# Patient Record
Sex: FEMALE | Race: BLACK OR AFRICAN AMERICAN | Employment: FULL TIME | URBAN - METROPOLITAN AREA
[De-identification: names, ages, dates, MRNs, and addresses within clinical notes are randomized per-mention and may not be internally consistent; named-entity substitution may affect disease eponyms.]

---

## 2024-03-12 ENCOUNTER — OFFICE VISIT (OUTPATIENT)
Dept: INTERNAL MEDICINE CLINIC | Facility: CLINIC | Age: 29
End: 2024-03-12
Payer: COMMERCIAL

## 2024-03-12 VITALS
OXYGEN SATURATION: 98 % | HEIGHT: 64 IN | WEIGHT: 196.2 LBS | BODY MASS INDEX: 33.49 KG/M2 | SYSTOLIC BLOOD PRESSURE: 116 MMHG | HEART RATE: 101 BPM | DIASTOLIC BLOOD PRESSURE: 76 MMHG

## 2024-03-12 DIAGNOSIS — J06.9 ACUTE URI: ICD-10-CM

## 2024-03-12 DIAGNOSIS — Z13.6 ENCOUNTER FOR LIPID SCREENING FOR CARDIOVASCULAR DISEASE: ICD-10-CM

## 2024-03-12 DIAGNOSIS — Z3A.08 8 WEEKS GESTATION OF PREGNANCY: ICD-10-CM

## 2024-03-12 DIAGNOSIS — Z13.220 ENCOUNTER FOR LIPID SCREENING FOR CARDIOVASCULAR DISEASE: ICD-10-CM

## 2024-03-12 DIAGNOSIS — Z00.00 ANNUAL PHYSICAL EXAM: Primary | ICD-10-CM

## 2024-03-12 PROBLEM — M25.562 CHRONIC PAIN OF LEFT KNEE: Status: ACTIVE | Noted: 2024-03-12

## 2024-03-12 PROBLEM — G89.29 CHRONIC PAIN OF LEFT KNEE: Status: ACTIVE | Noted: 2024-03-12

## 2024-03-12 PROCEDURE — 99385 PREV VISIT NEW AGE 18-39: CPT | Performed by: INTERNAL MEDICINE
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
## 2024-03-12 NOTE — PATIENT INSTRUCTIONS
-Take Mucinex for cough and congestion  -Administer a saline nasal spray as needed for sinus congestion  -Take Tylenol for pain and/or fever   -Perform daily salt water gargles for sore throat      Wellness Visit for Adults   AMBULATORY CARE:   A wellness visit  is when you see your healthcare provider to get screened for health problems. Your healthcare provider will also give you advice on how to stay healthy. Write down your questions so you remember to ask them. Ask your healthcare provider how often you should have a wellness visit.  What happens at a wellness visit:  Your healthcare provider will ask about your health, and your family history of health problems. This includes high blood pressure, heart disease, and cancer. He or she will ask if you have symptoms that concern you, if you smoke, and about your mood. You may also be asked about your intake of medicines, supplements, food, and alcohol. Any of the following may be done:  Your weight  will be checked. Your height may also be checked so your body mass index (BMI) can be calculated. Your BMI shows if you are at a healthy weight.    Your blood pressure  and heart rate will be checked. Your temperature may also be checked.    Blood and urine tests  may be done. Blood tests may be done to check your cholesterol levels. Abnormal cholesterol levels increase your risk for heart disease and stroke. You may also need a blood or urine test to check for diabetes if you are at increased risk. Urine tests may be done to look for signs of an infection or kidney disease.    A physical exam  includes checking your heartbeat and lungs with a stethoscope. Your healthcare provider may also check your skin to look for sun damage.    Screening tests  may be recommended. A screening test is done to check for diseases that may not cause symptoms. The screening tests you may need depend on your age, gender, family history, and lifestyle habits. For example, colorectal  screening may be recommended if you are 50 years old or older.    Screening tests you need if you are a woman:   A Pap smear  is used to screen for cervical cancer. Pap smears are usually done every 3 to 5 years depending on your age. You may need them more often if you have had abnormal Pap smear test results in the past. Ask your healthcare provider how often you should have a Pap smear.    A mammogram  is an x-ray of your breasts to screen for breast cancer. Experts recommend mammograms every 2 years starting at age 50 years. You may need a mammogram at age 49 years or younger if you have an increased risk for breast cancer. Talk to your healthcare provider about when you should start having mammograms and how often you need them.    Vaccines you may need:   Get an influenza vaccine  every year. The influenza vaccine protects you from the flu. Several types of viruses cause the flu. The viruses change over time, so new vaccines are made each year.    Get a tetanus-diphtheria (Td) booster vaccine  every 10 years. This vaccine protects you against tetanus and diphtheria. Tetanus is a severe infection that may cause painful muscle spasms and lockjaw. Diphtheria is a severe bacterial infection that causes a thick covering in the back of your mouth and throat.    Get a human papillomavirus (HPV) vaccine  if you are female and aged 19 to 26 or male 19 to 21 and never received it. This vaccine protects you from HPV infection. HPV is the most common infection spread by sexual contact. HPV may also cause vaginal, penile, and anal cancers.    Get a pneumococcal vaccine  if you are aged 65 years or older. The pneumococcal vaccine is an injection given to protect you from pneumococcal disease. Pneumococcal disease is an infection caused by pneumococcal bacteria. The infection may cause pneumonia, meningitis, or an ear infection.    Get a shingles vaccine  if you are 60 or older, even if you have had shingles before. The  shingles vaccine is an injection to protect you from the varicella-zoster virus. This is the same virus that causes chickenpox. Shingles is a painful rash that develops in people who had chickenpox or have been exposed to the virus.    How to eat healthy:  My Plate is a model for planning healthy meals. It shows the types and amounts of foods that should go on your plate. Fruits and vegetables make up about half of your plate, and grains and protein make up the other half. A serving of dairy is included on the side of your plate. The amount of calories and serving sizes you need depends on your age, gender, weight, and height. Examples of healthy foods are listed below:  Eat a variety of vegetables  such as dark green, red, and orange vegetables. You can also include canned vegetables low in sodium (salt) and frozen vegetables without added butter or sauces.    Eat a variety of fresh fruits , canned fruit in 100% juice, frozen fruit, and dried fruit.    Include whole grains.  At least half of the grains you eat should be whole grains. Examples include whole-wheat bread, wheat pasta, brown rice, and whole-grain cereals such as oatmeal.    Eat a variety of protein foods such as seafood (fish and shellfish), lean meat, and poultry without skin (turkey and chicken). Examples of lean meats include pork leg, shoulder, or tenderloin, and beef round, sirloin, tenderloin, and extra lean ground beef. Other protein foods include eggs and egg substitutes, beans, peas, soy products, nuts, and seeds.    Choose low-fat dairy products such as skim or 1% milk or low-fat yogurt, cheese, and cottage cheese.    Limit unhealthy fats  such as butter, hard margarine, and shortening.       Exercise:  Exercise at least 30 minutes per day on most days of the week. Some examples of exercise include walking, biking, dancing, and swimming. You can also fit in more physical activity by taking the stairs instead of the elevator or parking farther  away from stores. Include muscle strengthening activities 2 days each week. Regular exercise provides many health benefits. It helps you manage your weight, and decreases your risk for type 2 diabetes, heart disease, stroke, and high blood pressure. Exercise can also help improve your mood. Ask your healthcare provider about the best exercise plan for you.       General health and safety guidelines:   Do not smoke.  Nicotine and other chemicals in cigarettes and cigars can cause lung damage. Ask your healthcare provider for information if you currently smoke and need help to quit. E-cigarettes or smokeless tobacco still contain nicotine. Talk to your healthcare provider before you use these products.    Limit alcohol.  A drink of alcohol is 12 ounces of beer, 5 ounces of wine, or 1½ ounces of liquor.    Lose weight, if needed.  Being overweight increases your risk of certain health conditions. These include heart disease, high blood pressure, type 2 diabetes, and certain types of cancer.    Protect your skin.  Do not sunbathe or use tanning beds. Use sunscreen with a SPF 15 or higher. Apply sunscreen at least 15 minutes before you go outside. Reapply sunscreen every 2 hours. Wear protective clothing, hats, and sunglasses when you are outside.    Drive safely.  Always wear your seatbelt. Make sure everyone in your car wears a seatbelt. A seatbelt can save your life if you are in an accident. Do not use your cell phone when you are driving. This could distract you and cause an accident. Pull over if you need to make a call or send a text message.    Practice safe sex.  Use latex condoms if are sexually active and have more than one partner. Your healthcare provider may recommend screening tests for sexually transmitted infections (STIs).    Wear helmets, lifejackets, and protective gear.  Always wear a helmet when you ride a bike or motorcycle, go skiing, or play sports that could cause a head injury. Wear protective  equipment when you play sports. Wear a lifejacket when you are on a boat or doing water sports.    © Copyright [employer] 2023 Information is for End User's use only and may not be sold, redistributed or otherwise used for commercial purposes.  The above information is an  only. It is not intended as medical advice for individual conditions or treatments. Talk to your doctor, nurse or pharmacist before following any medical regimen to see if it is safe and effective for you.

## 2024-03-12 NOTE — ASSESSMENT & PLAN NOTE
-Take Mucinex for cough and congestion  -Administer a saline nasal spray as needed for sinus congestion  -Take Tylenol for pain and/or fever   -Perform daily salt water gargles for sore throat

## 2024-03-15 NOTE — PROGRESS NOTES
Assessment/Plan:    12 weeks gestation of pregnancy  LMP did not correspond to imaging VIKI    Imaging VIKI 12 weeks 9/30/2024  PNC consult placed  Plan for PN intake       Diagnoses and all orders for this visit:    Encounter to determine fetal viability of pregnancy, single or unspecified fetus  -     AMB US OB < 14 weeks single or first gestation level 1    8 weeks gestation of pregnancy  -     Ambulatory Referral to Obstetrics / Gynecology    12 weeks gestation of pregnancy  -     Ambulatory Referral to Maternal Fetal Medicine; Future    Encounter for supervision pregnancy in primigravida, antepartum  -     Ambulatory Referral to Maternal Fetal Medicine; Future    Other orders  -     Ferrous Sulfate (Iron) 28 MG TABS; Take by mouth SUPPLEMENT HAS VIT C AND B12  -     NON FORMULARY; 1,000 mcg FOLIC ACID          Subjective:      Patient ID: Chelsea Garcia is a 29 y.o. female.    Patient here for early ultrasound. LMP 1/10/24. Unplanned but welcoming. Has not began PNV yet. Taking folic acid and iron. Nausea, vomiting, food adversions, smell aversions, some pelvic cramping, emotional, food cravings.                 The following portions of the patient's history were reviewed and updated as appropriate: She  has a past medical history of Urinary tract infection.  She   Patient Active Problem List    Diagnosis Date Noted    Chronic pain of left knee 03/12/2024    12 weeks gestation of pregnancy 03/12/2024    Acute URI 03/12/2024     She  has no past surgical history on file.  Her family history includes Hypertension in her father; No Known Problems in her mother; Stroke in her father.  She  reports that she has never smoked. She has never been exposed to tobacco smoke. She has never used smokeless tobacco. She reports that she does not currently use alcohol. She reports that she does not use drugs.  Current Outpatient Medications   Medication Sig Dispense Refill    Ferrous Sulfate (Iron) 28 MG TABS Take by mouth  SUPPLEMENT HAS VIT C AND B12      Multiple Vitamins-Minerals (EQ Multivitamins Adult Gummy) CHEW Chew daily gummies      NON FORMULARY 1,000 mcg FOLIC ACID       No current facility-administered medications for this visit.     Current Outpatient Medications on File Prior to Visit   Medication Sig    Ferrous Sulfate (Iron) 28 MG TABS Take by mouth SUPPLEMENT HAS VIT C AND B12    Multiple Vitamins-Minerals (EQ Multivitamins Adult Gummy) CHEW Chew daily gummies    NON FORMULARY 1,000 mcg FOLIC ACID     No current facility-administered medications on file prior to visit.     She is allergic to latex and plum pulp - food allergy..    Review of Systems      Objective:      /78 (BP Location: Left arm, Patient Position: Sitting, Cuff Size: Standard)   Wt 88.9 kg (196 lb)   LMP 01/10/2024 (Approximate)   BMI 33.64 kg/m²          Physical Exam

## 2024-03-18 ENCOUNTER — INITIAL PRENATAL (OUTPATIENT)
Dept: OBGYN CLINIC | Facility: CLINIC | Age: 29
End: 2024-03-18

## 2024-03-18 VITALS — BODY MASS INDEX: 33.64 KG/M2 | DIASTOLIC BLOOD PRESSURE: 78 MMHG | WEIGHT: 196 LBS | SYSTOLIC BLOOD PRESSURE: 132 MMHG

## 2024-03-18 DIAGNOSIS — O36.80X0 ENCOUNTER TO DETERMINE FETAL VIABILITY OF PREGNANCY, SINGLE OR UNSPECIFIED FETUS: Primary | ICD-10-CM

## 2024-03-18 DIAGNOSIS — Z3A.12 12 WEEKS GESTATION OF PREGNANCY: ICD-10-CM

## 2024-03-18 DIAGNOSIS — Z3A.08 8 WEEKS GESTATION OF PREGNANCY: ICD-10-CM

## 2024-03-18 DIAGNOSIS — Z34.00 ENCOUNTER FOR SUPERVISION PREGNANCY IN PRIMIGRAVIDA, ANTEPARTUM: ICD-10-CM

## 2024-03-18 NOTE — ASSESSMENT & PLAN NOTE
LMP did not correspond to imaging VIKI    Imaging VIKI 12 weeks 9/30/2024  PNC consult placed  Plan for PN intake

## 2024-03-21 ENCOUNTER — INITIAL PRENATAL (OUTPATIENT)
Dept: OBGYN CLINIC | Facility: CLINIC | Age: 29
End: 2024-03-21

## 2024-03-21 VITALS — BODY MASS INDEX: 33.88 KG/M2 | SYSTOLIC BLOOD PRESSURE: 106 MMHG | WEIGHT: 197.4 LBS | DIASTOLIC BLOOD PRESSURE: 50 MMHG

## 2024-03-21 DIAGNOSIS — Z01.419 ENCOUNTER FOR GYNECOLOGICAL EXAMINATION WITHOUT ABNORMAL FINDING: ICD-10-CM

## 2024-03-21 DIAGNOSIS — Z34.01 PRENATAL CARE, FIRST PREGNANCY, FIRST TRIMESTER: Primary | ICD-10-CM

## 2024-03-21 DIAGNOSIS — Z11.3 SCREENING FOR STDS (SEXUALLY TRANSMITTED DISEASES): ICD-10-CM

## 2024-03-21 DIAGNOSIS — Z34.01 ENCOUNTER FOR SUPERVISION OF NORMAL FIRST PREGNANCY IN FIRST TRIMESTER: Primary | ICD-10-CM

## 2024-03-21 DIAGNOSIS — Z36.9 UNSPECIFIED ANTENATAL SCREENING: ICD-10-CM

## 2024-03-21 DIAGNOSIS — Z31.430 ENCOUNTER OF FEMALE FOR TESTING FOR GENETIC DISEASE CARRIER STATUS FOR PROCREATIVE MANAGEMENT: ICD-10-CM

## 2024-03-21 PROCEDURE — 87491 CHLMYD TRACH DNA AMP PROBE: CPT | Performed by: OBSTETRICS & GYNECOLOGY

## 2024-03-21 PROCEDURE — G0145 SCR C/V CYTO,THINLAYER,RESCR: HCPCS | Performed by: OBSTETRICS & GYNECOLOGY

## 2024-03-21 PROCEDURE — 87591 N.GONORRHOEAE DNA AMP PROB: CPT | Performed by: OBSTETRICS & GYNECOLOGY

## 2024-03-21 PROCEDURE — PNV: Performed by: OBSTETRICS & GYNECOLOGY

## 2024-03-21 PROCEDURE — OBC

## 2024-03-21 RX ORDER — ASPIRIN 81 MG/1
162 TABLET, CHEWABLE ORAL DAILY
COMMUNITY
Start: 2024-03-21

## 2024-03-21 RX ORDER — ACETAMINOPHEN 325 MG/1
650 TABLET ORAL EVERY 6 HOURS PRN
COMMUNITY

## 2024-03-21 NOTE — ASSESSMENT & PLAN NOTE
PN1Reese Tran is a 29 y.o. year old  at 12w 3 d who presents for initial prenatal visit. 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 Denies pelvic pain, bleeding, LOF.   Meets criteria to start  mg dialy, pt has been advised to  at pharmacy and start taking     Prenatal labs not completed . Pap & GC/CT sent today.    Patient Education: Patient was counseled regarding diet, exercise, weight gain, foods to avoid, vaccines in pregnancy, aneuploidy screening, travel precautions to include seat belt use and VTE risk reduction.  She has been provided our pregnancy packet which includes pregnancy warning signs,how and when to contact providers, visit intervals, Maternal fetal medicine information, medication recommendations that are safe during pregnancy, dietary suggestions, activity recommendations, breastfeeding information as well as websites for additional information, and delivery location.    Past Medical History:   Diagnosis Date    Migraine     hx of migraines    Urinary tract infection      No past surgical history on file.  Immunization History   Administered Date(s) Administered    COVID-19 PFIZER VACCINE 0.3 ML IM 2021, 2021         Family History   Problem Relation Age of Onset    No Known Problems Mother     Hypertension Father     Stroke Father     No Known Problems Maternal Grandmother     No Known Problems Maternal Grandfather     No Known Problems Paternal Grandmother     No Known Problems Paternal Grandfather      Social History     Tobacco Use    Smoking status: Never     Passive exposure: Never    Smokeless tobacco: Never   Vaping Use    Vaping status: Never Used   Substance Use Topics    Alcohol use: Not Currently    Drug use: Never     Comment: denies self, FOB denies, FOB's mother- etoh abuse       Current Outpatient Medications:     acetaminophen (TYLENOL) 325 mg tablet, Take 650 mg by  mouth every 6 (six) hours as needed for mild pain, Disp: , Rfl:     Ferrous Sulfate (Iron) 28 MG TABS, Take by mouth SUPPLEMENT HAS VIT C AND B12, Disp: , Rfl:     Multiple Vitamins-Minerals (EQ Multivitamins Adult Gummy) CHEW, Chew daily gummies, Disp: , Rfl:     NON FORMULARY, 1,000 mcg FOLIC ACID, Disp: , Rfl:   Patient Active Problem List    Diagnosis Date Noted    Chronic pain of left knee 2024    12 weeks gestation of pregnancy 2024    Acute URI 2024       Allergies   Allergen Reactions    Latex Rash    Plum Pulp - Food Allergy Rash       OB History    Para Term  AB Living   1             SAB IAB Ectopic Multiple Live Births                  # Outcome Date GA Lbr Parviz/2nd Weight Sex Delivery Anes PTL Lv   1 Current                Vitals:    24 0921   BP: 106/50   Weight: 89.5 kg (197 lb 6.4 oz)     Body mass index is 33.88 kg/m².

## 2024-03-21 NOTE — PATIENT INSTRUCTIONS
"Valuable Online Resource:    [facility] pregnancy essential guide    [email or web address]      On the right side of the screen is a 50 page guide providing valuable information about your entire pregnancy.    On the left hand side of the site you will see several other links to great information and resources that [facility] offers     If you click on the tab that says \"Pregnancy and Birth Packet\" this opens another  guide to labor and delivery information as well as breast feeding information,  care, pediatricians, car seat safety and much more     The [facility] tab has a virtual tour of the new L&D unit, as well as valuable information about classes that are offered, breast feeding support, support groups and much more.     I highly recommend the virtual Breast Feeding class, very informational even if you have breast fed in the past. Check for available dates !    Click around and enjoy all we have to offer!    Please note that all information in regards to locations and visiting hours have not been updated due to COVID    Your delivery location is [facility] @ [address] [address]         Maternal Fetal Medicine     Nuchal Translucency ( NT) ultrasound is offered in the first trimester of pregnancy to assess your developing baby and look for any markers that may indicate a risk for certain chromosomal conditions such as Down's syndrome.  This ultrasound is offered to all pregnant women along with several options for blood screening.     During your ultrasound appointment the Perinatologist will discuss these options and together you can decide which screen is best for you.  We encourage you to view the following video prior to your appointment to learn more:  [email or web address]     Please check your individual insurance plan to determine if the screening is covered, requires prior " authorization or if you will incur any out of pocket cost.   It is also important to know if your insurance company has a preferred in network lab such as [facility] or [facility].     Below is list of CPT codes for blood screening options.   CPT codes are procedure codes that tell your insurance company what testing is being done.     In order for testing to be performed in a timely and efficient manner it is best if you have this information available before your first visit with our office.  Please note, [facility]'s genetic testing division is called [email or web address].     Sequential Screen - 2 part screen, CPT codes are dependent on the lab used:     [facility] lab    Part 1 code 09086,20155      Part 2 code 82053,02588,43578, 22128     [facility]            Part 1 code  99556                Part 2 code 60224        NIPT (cell free DNA testing)  CPT code 73982        NIPT testing through [facility]/ [facility] is called [id number].   Please use the  @ [employer]   > click estimate my cost>  pregnancy>   [id number] plus  OR call # [phone] and speak to a .   Be sure to ask about the Every MOMS 1EQ program for additional financial assistance.     NIPT testing through [facility] is called [facility].  Please use the  @ [email or web address].     If you have any questions please feel free to reach out to our office at [phone].  Genetic Counseling appointments are available for any patient but strongly encouraged for Moms 35 or older or patients with family history of genetic disorders. Pregnancy at 11 to 14 Weeks   AMBULATORY CARE:   Changes happening to your body:  You are now at the end of your first trimester and entering your second trimester. Morning sickness usually goes away by this time. You may have other symptoms such as fatigue, frequent urination, and headaches. You may have gained 2 to 4 pounds by now.  Seek care  immediately if:   You have pain or cramping in your abdomen or low back.    You have heavy vaginal bleeding or clotting.    You pass material that looks like tissue or large clots. Collect the material and bring it with you.    Call your doctor or obstetrician if:   You cannot keep food or drinks down, and you are losing weight.    You have light vaginal bleeding.    You have chills or a fever.    You have vaginal itching, burning, or pain.    You have yellow, green, white, or foul-smelling vaginal discharge.    You have pain or burning when you urinate, less urine than usual, or pink or bloody urine.    You have questions or concerns about your condition or care.    How to care for yourself at this stage of your pregnancy:       Get plenty of rest.  You may feel more tired than normal. You may need to take naps or go to bed earlier.    Manage nausea and vomiting.  Avoid fatty and spicy foods. Eat small meals throughout the day instead of large meals. Desiree may help to decrease nausea. Ask your healthcare provider about other ways of decreasing nausea and vomiting.    Eat a variety of healthy foods.  Healthy foods include fruits, vegetables, whole-grain breads, low-fat dairy foods, beans, lean meats, and fish. Drink liquids as directed. Ask how much liquid to drink each day and which liquids are best for you. Limit caffeine to less than 200 milligrams each day. Limit your intake of fish to 2 servings each week. Choose fish low in mercury such as canned light tuna, shrimp, salmon, cod, or tilapia. Do not  eat fish high in mercury such as swordfish, tilefish, yoselyn mackerel, and shark.         Take prenatal vitamins as directed.  Your need for certain vitamins and minerals, such as folic acid, increases during pregnancy. Prenatal vitamins provide some of the extra vitamins and minerals you need. Prenatal vitamins may also help to decrease the risk of certain birth defects.         Do not smoke.  Smoking increases your  risk of a miscarriage and other health problems during your pregnancy. Smoking can cause your baby to be born too early or weigh less at birth. Ask your healthcare provider for information if you need help quitting.    Do not drink alcohol.  Alcohol passes from your body to your baby through the placenta. It can affect your baby's brain development and cause fetal alcohol syndrome (FAS). FAS is a group of conditions that causes mental, behavior, and growth problems.    Talk to your healthcare provider before you take any medicines.  Many medicines may harm your baby if you take them when you are pregnant. Do not take any medicines, vitamins, herbs, or supplements without first talking to your healthcare provider. Never use illegal or street drugs (such as marijuana or cocaine) while you are pregnant.  Safety tips during pregnancy:   Avoid hot tubs and saunas.  Do not use a hot tub or sauna while you are pregnant, especially during your first trimester. Hot tubs and saunas may raise your baby's temperature and increase the risk of birth defects.    Avoid toxoplasmosis.  This is an infection caused by eating raw meat or being around infected cat feces. It can cause birth defects, miscarriages, and other problems. Wash your hands after you touch raw meat. Make sure any meat is well-cooked before you eat it. Avoid raw eggs and unpasteurized milk. Use gloves or ask someone else to clean your cat's litter box while you are pregnant.    Changes happening with your baby:  Your baby has fully formed fingernails and toenails. Your baby's heartbeat can now be heard. Ask your healthcare provider if you can listen to your baby's heartbeat. By week 14, your baby is over 4 inches long from the top of the head to the rump (baby's bottom). Your baby weighs over 3 ounces.  Prenatal care:  Prenatal care is a series of visits with your healthcare provider throughout your pregnancy. During the first 28 weeks of your pregnancy, you will  see your healthcare provider 1 time each month. Prenatal care can help prevent problems during pregnancy and childbirth. Your healthcare provider will check your blood pressure and weight. Your baby's heart rate will also be checked. You may also need the following at some visits:  A pelvic exam  allows your healthcare provider to see your cervix (the bottom part of your uterus). Your healthcare provider will use a speculum to open your vagina. He or she will check the size and shape of your uterus.    Blood tests  may be done to check for any of the following:    Gestational diabetes or anemia (low iron level)    Blood type or Rh factor, or certain birth defects    Immunity to certain diseases, such as chickenpox or rubella    An infection, such as a sexually transmitted infection, HIV, or hepatitis B    Hepatitis B  may need to be prevented or treated. Hepatitis B is inflammation of the liver caused by the hepatitis B virus (HBV). HBV can spread from a mother to her baby during delivery. You will be checked for HBV as early as possible in the first trimester of each pregnancy. You need the test even if you received the hepatitis B vaccine or were tested before. You may need to have an HBV infection treated before you give birth.    Urine tests  may also be done to check for sugar and protein. These can be signs of gestational diabetes or preeclampsia. Urine tests may also be done to check for signs of infection.    A fetal ultrasound  shows pictures of your baby inside your uterus. The pictures are used to check your baby's development, movement, and position.         Genetic disorder screening tests  may be offered to you. These tests check your baby's risk for genetic disorders such as Down syndrome. A screening test includes a blood test and ultrasound.    Follow up with your doctor or obstetrician as directed:  Go to all prenatal visits. Write down your questions so you remember to ask them during your  visits.  © Copyright [employer] 2023 Information is for End User's use only and may not be sold, redistributed or otherwise used for commercial purposes.  The above information is an  only. It is not intended as medical advice for individual conditions or treatments. Talk to your doctor, nurse or pharmacist before following any medical regimen to see if it is safe and effective for you.

## 2024-03-21 NOTE — PROGRESS NOTES
Problem   12 Weeks Gestation of Pregnancy    Blood Type: .       Rhogam:   Pap Not on file. GC/CT   PN1 Labs: not completed     H&H:   ASA daily  28 Week Labs:  AFP:  Level 1:3/26/24  Level 2:  Tdap:  Flu:   GBS swab:     Blue folder:  Yellow folder:     Breast pump order:  L&D forms:    Delivery consent:   IOL:            12 weeks gestation of pregnancy  PN1Reese Tran is a 29 y.o. year old  at 12w 3 d who presents for initial prenatal visit. Planned pregnancy  She and her partner work in a [employer] center   No cats in the home    Denies complaints. Denies pelvic pain, bleeding, LOF.   Meets criteria to start  mg dialy, pt has been advised to  at pharmacy and start taking     Prenatal labs not completed . Pap & GC/CT sent today.    Patient Education: Patient was counseled regarding diet, exercise, weight gain, foods to avoid, vaccines in pregnancy, aneuploidy screening, travel precautions to include seat belt use and VTE risk reduction.  She has been provided our pregnancy packet which includes pregnancy warning signs,how and when to contact providers, visit intervals, Maternal fetal medicine information, medication recommendations that are safe during pregnancy, dietary suggestions, activity recommendations, breastfeeding information as well as websites for additional information, and delivery location.    Past Medical History:   Diagnosis Date    Migraine     hx of migraines    Urinary tract infection      No past surgical history on file.  Immunization History   Administered Date(s) Administered    COVID-19 PFIZER VACCINE 0.3 ML IM 2021, 2021         Family History   Problem Relation Age of Onset    No Known Problems Mother     Hypertension Father     Stroke Father     No Known Problems Maternal Grandmother     No Known Problems Maternal Grandfather     No Known Problems Paternal Grandmother     No Known Problems Paternal Grandfather      Social History     Tobacco  Use    Smoking status: Never     Passive exposure: Never    Smokeless tobacco: Never   Vaping Use    Vaping status: Never Used   Substance Use Topics    Alcohol use: Not Currently    Drug use: Never     Comment: denies self, FOB denies, FOB's mother- etoh abuse       Current Outpatient Medications:     acetaminophen (TYLENOL) 325 mg tablet, Take 650 mg by mouth every 6 (six) hours as needed for mild pain, Disp: , Rfl:     Ferrous Sulfate (Iron) 28 MG TABS, Take by mouth SUPPLEMENT HAS VIT C AND B12, Disp: , Rfl:     Multiple Vitamins-Minerals (EQ Multivitamins Adult Gummy) CHEW, Chew daily gummies, Disp: , Rfl:     NON FORMULARY, 1,000 mcg FOLIC ACID, Disp: , Rfl:   Patient Active Problem List    Diagnosis Date Noted    Chronic pain of left knee 2024    12 weeks gestation of pregnancy 2024    Acute URI 2024       Allergies   Allergen Reactions    Latex Rash    Plum Pulp - Food Allergy Rash       OB History    Para Term  AB Living   1             SAB IAB Ectopic Multiple Live Births                  # Outcome Date GA Lbr Parviz/2nd Weight Sex Delivery Anes PTL Lv   1 Current                Vitals:    24 0921   BP: 106/50   Weight: 89.5 kg (197 lb 6.4 oz)     Body mass index is 33.88 kg/m².

## 2024-03-21 NOTE — PROGRESS NOTES
Patient is here for Initial PN 1 visit.  She states she has cramping occasionally; denies spotting or nausea  12wks/3days. VIKI: 9/30/24.  Last pap: collected today Pap: many years ago; denies history of abnormal pap  GC/CH collected:yes  PN 1 labs not completed; ordered today.  Blood type: N/A; labs not completed yet  Flu vaccine: no. Covid vaccine: yes  Urine: patient unable to void  Blue information packet given: yes  Nuchal: 3/26/24
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
## 2024-03-21 NOTE — PATIENT INSTRUCTIONS
Congratulations!! Please review our Pregnancy Essential Guide and [facility] L&D Virtual tour from our networks website.     [facility] Pregnancy Essentials Guide  [facility] ([email or web address])     [facility] Virtual Tour ([email or web address])        Pregnancy at 11 to 14 Weeks   AMBULATORY CARE:   Changes happening to your body:  You are now at the end of your first trimester and entering your second trimester. Morning sickness usually goes away by this time. You may have other symptoms such as fatigue, frequent urination, and headaches. You may have gained 2 to 4 pounds by now.  Seek care immediately if:   You have pain or cramping in your abdomen or low back.    You have heavy vaginal bleeding or clotting.    You pass material that looks like tissue or large clots. Collect the material and bring it with you.    Call your doctor or obstetrician if:   You cannot keep food or drinks down, and you are losing weight.    You have light vaginal bleeding.    You have chills or a fever.    You have vaginal itching, burning, or pain.    You have yellow, green, white, or foul-smelling vaginal discharge.    You have pain or burning when you urinate, less urine than usual, or pink or bloody urine.    You have questions or concerns about your condition or care.    How to care for yourself at this stage of your pregnancy:       Get plenty of rest.  You may feel more tired than normal. You may need to take naps or go to bed earlier.    Manage nausea and vomiting.  Avoid fatty and spicy foods. Eat small meals throughout the day instead of large meals. Desiree may help to decrease nausea. Ask your healthcare provider about other ways of decreasing nausea and vomiting.    Eat a variety of healthy foods.  Healthy foods include fruits, vegetables, whole-grain breads, low-fat dairy foods, beans, lean meats, and fish. Drink liquids as directed. Ask how much liquid to drink each day and which liquids are best for you.  Limit caffeine to less than 200 milligrams each day. Limit your intake of fish to 2 servings each week. Choose fish low in mercury such as canned light tuna, shrimp, salmon, cod, or tilapia. Do not  eat fish high in mercury such as swordfish, tilefish, yoselyn mackerel, and shark.         Take prenatal vitamins as directed.  Your need for certain vitamins and minerals, such as folic acid, increases during pregnancy. Prenatal vitamins provide some of the extra vitamins and minerals you need. Prenatal vitamins may also help to decrease the risk of certain birth defects.         Do not smoke.  Smoking increases your risk of a miscarriage and other health problems during your pregnancy. Smoking can cause your baby to be born too early or weigh less at birth. Ask your healthcare provider for information if you need help quitting.    Do not drink alcohol.  Alcohol passes from your body to your baby through the placenta. It can affect your baby's brain development and cause fetal alcohol syndrome (FAS). FAS is a group of conditions that causes mental, behavior, and growth problems.    Talk to your healthcare provider before you take any medicines.  Many medicines may harm your baby if you take them when you are pregnant. Do not take any medicines, vitamins, herbs, or supplements without first talking to your healthcare provider. Never use illegal or street drugs (such as marijuana or cocaine) while you are pregnant.  Safety tips during pregnancy:   Avoid hot tubs and saunas.  Do not use a hot tub or sauna while you are pregnant, especially during your first trimester. Hot tubs and saunas may raise your baby's temperature and increase the risk of birth defects.    Avoid toxoplasmosis.  This is an infection caused by eating raw meat or being around infected cat feces. It can cause birth defects, miscarriages, and other problems. Wash your hands after you touch raw meat. Make sure any meat is well-cooked before you eat it. Avoid  raw eggs and unpasteurized milk. Use gloves or ask someone else to clean your cat's litter box while you are pregnant.    Changes happening with your baby:  Your baby has fully formed fingernails and toenails. Your baby's heartbeat can now be heard. Ask your healthcare provider if you can listen to your baby's heartbeat. By week 14, your baby is over 4 inches long from the top of the head to the rump (baby's bottom). Your baby weighs over 3 ounces.  Prenatal care:  Prenatal care is a series of visits with your healthcare provider throughout your pregnancy. During the first 28 weeks of your pregnancy, you will see your healthcare provider 1 time each month. Prenatal care can help prevent problems during pregnancy and childbirth. Your healthcare provider will check your blood pressure and weight. Your baby's heart rate will also be checked. You may also need the following at some visits:  A pelvic exam  allows your healthcare provider to see your cervix (the bottom part of your uterus). Your healthcare provider will use a speculum to open your vagina. He or she will check the size and shape of your uterus.    Blood tests  may be done to check for any of the following:    Gestational diabetes or anemia (low iron level)    Blood type or Rh factor, or certain birth defects    Immunity to certain diseases, such as chickenpox or rubella    An infection, such as a sexually transmitted infection, HIV, or hepatitis B    Hepatitis B  may need to be prevented or treated. Hepatitis B is inflammation of the liver caused by the hepatitis B virus (HBV). HBV can spread from a mother to her baby during delivery. You will be checked for HBV as early as possible in the first trimester of each pregnancy. You need the test even if you received the hepatitis B vaccine or were tested before. You may need to have an HBV infection treated before you give birth.    Urine tests  may also be done to check for sugar and protein. These can be  signs of gestational diabetes or preeclampsia. Urine tests may also be done to check for signs of infection.    A fetal ultrasound  shows pictures of your baby inside your uterus. The pictures are used to check your baby's development, movement, and position.         Genetic disorder screening tests  may be offered to you. These tests check your baby's risk for genetic disorders such as Down syndrome. A screening test includes a blood test and ultrasound.    Follow up with your doctor or obstetrician as directed:  Go to all prenatal visits. Write down your questions so you remember to ask them during your visits.  © Copyright [employer] 2023 Information is for End User's use only and may not be sold, redistributed or otherwise used for commercial purposes.  The above information is an  only. It is not intended as medical advice for individual conditions or treatments. Talk to your doctor, nurse or pharmacist before following any medical regimen to see if it is safe and effective for you.

## 2024-03-22 ENCOUNTER — TELEPHONE (OUTPATIENT)
Age: 29
End: 2024-03-22

## 2024-03-22 NOTE — TELEPHONE ENCOUNTER
Patient would like a letter stating she can break her lease for her rental due to her pregnancy- she is on the third flr. She is looking to move to a better location. Please advise.

## 2024-03-23 LAB
C TRACH DNA SPEC QL NAA+PROBE: NEGATIVE
N GONORRHOEA DNA SPEC QL NAA+PROBE: NEGATIVE

## 2024-03-25 PROBLEM — O99.210 OBESITY AFFECTING PREGNANCY, ANTEPARTUM: Status: ACTIVE | Noted: 2024-03-25

## 2024-03-26 ENCOUNTER — ROUTINE PRENATAL (OUTPATIENT)
Dept: PERINATAL CARE | Facility: OTHER | Age: 29
End: 2024-03-26
Payer: COMMERCIAL

## 2024-03-26 ENCOUNTER — TELEPHONE (OUTPATIENT)
Dept: OBGYN CLINIC | Facility: CLINIC | Age: 29
End: 2024-03-26

## 2024-03-26 VITALS
SYSTOLIC BLOOD PRESSURE: 110 MMHG | DIASTOLIC BLOOD PRESSURE: 70 MMHG | HEIGHT: 64 IN | HEART RATE: 86 BPM | WEIGHT: 198 LBS | BODY MASS INDEX: 33.8 KG/M2

## 2024-03-26 DIAGNOSIS — Z36.82 ENCOUNTER FOR ANTENATAL SCREENING FOR NUCHAL TRANSLUCENCY: Primary | ICD-10-CM

## 2024-03-26 DIAGNOSIS — Z34.00 ENCOUNTER FOR SUPERVISION PREGNANCY IN PRIMIGRAVIDA, ANTEPARTUM: ICD-10-CM

## 2024-03-26 DIAGNOSIS — O99.211 MATERNAL OBESITY, ANTEPARTUM, FIRST TRIMESTER: ICD-10-CM

## 2024-03-26 DIAGNOSIS — Z3A.13 13 WEEKS GESTATION OF PREGNANCY: ICD-10-CM

## 2024-03-26 PROCEDURE — 99203 OFFICE O/P NEW LOW 30 MIN: CPT | Performed by: OBSTETRICS & GYNECOLOGY

## 2024-03-26 PROCEDURE — 76801 OB US < 14 WKS SINGLE FETUS: CPT | Performed by: OBSTETRICS & GYNECOLOGY

## 2024-03-26 PROCEDURE — 76813 OB US NUCHAL MEAS 1 GEST: CPT | Performed by: OBSTETRICS & GYNECOLOGY

## 2024-03-26 NOTE — LETTER
March 26, 2024     Maggi Montoya MD  [address]  [address] [place] [id number]    Patient: Chelsea Garcia   YOB: 1995   Date of Visit: 3/26/2024       Dear Dr. Montoya:    Thank you for referring Chelsea Garica to me for evaluation. Below are my notes for this consultation.    If you have questions, please do not hesitate to call me. I look forward to following your patient along with you.         Sincerely,        Maverick Pollard MD        CC: No Recipients    Maverick Pollard MD  3/26/2024  8:09 AM  Sign when Signing Visit  Please refer to the [facility] ultrasound report in Ob Procedures for additional information regarding today's visit

## 2024-03-26 NOTE — TELEPHONE ENCOUNTER
"Lives on the 3rd floor- no elevators patient is looking to move and wants to move sooner rather than later- or to lower level apt within her complex    Looking for drs note:     wanting to take a lesser hit on fees charged to breaking her lease.     Due to back pain/pregnancy and climbing stairs with no option for elevators patient is looking for a note stating that a     \"Lower level apartment is recommended\"    Advised patient I will ask provider but no guarantee it will be written as pregnancy alone is not a disability preventing her from walking up and down stairs. Will reach out to patient once decision has been made.     Patient verbalized understanding.   "

## 2024-03-26 NOTE — TELEPHONE ENCOUNTER
Ms. 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  Please call to discuss details.

## 2024-03-26 NOTE — PROGRESS NOTES
Patient has [employer] insurance. Patient was offered the self pay option through [facility] but declined. Explained to patient her insurance requires a prior authorization before [facility] NIPS can be drawn. In-basket message routed to Maternal Fetal Medicine clinical pool to complete prior authorization. Our office will contact the patient within 10-14 business days with the status of authorization. If patient does not hear back from our Maternal Fetal Medicine office after 14 business days to please call [phone].     Patient is aware if this test is drawn without prior authorization she may be responsible for full cost of test. Insurance Authorization is not a guarantee of payment and final determination is based on your member benefits, appropriateness of service provided and eligibility at time of services rendered and claim received.    
Please refer to the [facility] ultrasound report in Ob Procedures for additional information regarding today's visit  
pulse oximetry

## 2024-03-27 ENCOUNTER — TELEPHONE (OUTPATIENT)
Facility: HOSPITAL | Age: 29
End: 2024-03-27

## 2024-03-27 ENCOUNTER — LAB (OUTPATIENT)
Dept: LAB | Facility: CLINIC | Age: 29
End: 2024-03-27
Payer: COMMERCIAL

## 2024-03-27 DIAGNOSIS — Z3A.08 8 WEEKS GESTATION OF PREGNANCY: ICD-10-CM

## 2024-03-27 DIAGNOSIS — Z13.220 ENCOUNTER FOR LIPID SCREENING FOR CARDIOVASCULAR DISEASE: ICD-10-CM

## 2024-03-27 DIAGNOSIS — Z34.01 ENCOUNTER FOR SUPERVISION OF NORMAL FIRST PREGNANCY IN FIRST TRIMESTER: ICD-10-CM

## 2024-03-27 DIAGNOSIS — Z00.00 ANNUAL PHYSICAL EXAM: ICD-10-CM

## 2024-03-27 DIAGNOSIS — Z31.430 ENCOUNTER OF FEMALE FOR TESTING FOR GENETIC DISEASE CARRIER STATUS FOR PROCREATIVE MANAGEMENT: ICD-10-CM

## 2024-03-27 DIAGNOSIS — Z13.6 ENCOUNTER FOR LIPID SCREENING FOR CARDIOVASCULAR DISEASE: ICD-10-CM

## 2024-03-27 DIAGNOSIS — Z36.9 UNSPECIFIED ANTENATAL SCREENING: ICD-10-CM

## 2024-03-27 LAB
ABO GROUP BLD: NORMAL
AMORPH URATE CRY URNS QL MICRO: ABNORMAL
BACTERIA UR QL AUTO: ABNORMAL /HPF
BASOPHILS # BLD AUTO: 0.01 THOUSANDS/ÂΜL (ref 0–0.1)
BASOPHILS NFR BLD AUTO: 0 % (ref 0–1)
BILIRUB UR QL STRIP: NEGATIVE
BLD GP AB SCN SERPL QL: NEGATIVE
CLARITY UR: ABNORMAL
COLOR UR: ABNORMAL
EOSINOPHIL # BLD AUTO: 0.03 THOUSAND/ÂΜL (ref 0–0.61)
EOSINOPHIL NFR BLD AUTO: 1 % (ref 0–6)
ERYTHROCYTE [DISTWIDTH] IN BLOOD BY AUTOMATED COUNT: 13.6 % (ref 11.6–15.1)
GLUCOSE 1H P 50 G GLC PO SERPL-MCNC: 93 MG/DL (ref 70–134)
GLUCOSE UR STRIP-MCNC: NEGATIVE MG/DL
HBV SURFACE AG SER QL: NORMAL
HCT VFR BLD AUTO: 38.5 % (ref 34.8–46.1)
HCV AB SER QL: NORMAL
HGB BLD-MCNC: 12.1 G/DL (ref 11.5–15.4)
HGB UR QL STRIP.AUTO: NEGATIVE
HIV 1+2 AB+HIV1 P24 AG SERPL QL IA: NORMAL
HIV 2 AB SERPL QL IA: NORMAL
HIV1 AB SERPL QL IA: NORMAL
HIV1 P24 AG SERPL QL IA: NORMAL
IMM GRANULOCYTES # BLD AUTO: 0.02 THOUSAND/UL (ref 0–0.2)
IMM GRANULOCYTES NFR BLD AUTO: 0 % (ref 0–2)
KETONES UR STRIP-MCNC: NEGATIVE MG/DL
LAB AP GYN PRIMARY INTERPRETATION: NORMAL
LAB AP LMP: NORMAL
LEUKOCYTE ESTERASE UR QL STRIP: NEGATIVE
LYMPHOCYTES # BLD AUTO: 1.32 THOUSANDS/ÂΜL (ref 0.6–4.47)
LYMPHOCYTES NFR BLD AUTO: 26 % (ref 14–44)
Lab: NORMAL
MCH RBC QN AUTO: 25.9 PG (ref 26.8–34.3)
MCHC RBC AUTO-ENTMCNC: 31.4 G/DL (ref 31.4–37.4)
MCV RBC AUTO: 82 FL (ref 82–98)
MONOCYTES # BLD AUTO: 0.53 THOUSAND/ÂΜL (ref 0.17–1.22)
MONOCYTES NFR BLD AUTO: 11 % (ref 4–12)
NEUTROPHILS # BLD AUTO: 3.09 THOUSANDS/ÂΜL (ref 1.85–7.62)
NEUTS SEG NFR BLD AUTO: 62 % (ref 43–75)
NITRITE UR QL STRIP: NEGATIVE
NON-SQ EPI CELLS URNS QL MICRO: ABNORMAL /HPF
NRBC BLD AUTO-RTO: 0 /100 WBCS
PATH INTERP SPEC-IMP: NORMAL
PH UR STRIP.AUTO: 7.5 [PH]
PLATELET # BLD AUTO: 313 THOUSANDS/UL (ref 149–390)
PMV BLD AUTO: 11.2 FL (ref 8.9–12.7)
PROT UR STRIP-MCNC: ABNORMAL MG/DL
RBC # BLD AUTO: 4.68 MILLION/UL (ref 3.81–5.12)
RBC #/AREA URNS AUTO: ABNORMAL /HPF
RH BLD: POSITIVE
RUBV IGG SERPL IA-ACNC: <10 IU/ML
SP GR UR STRIP.AUTO: 1.02 (ref 1–1.03)
TREPONEMA PALLIDUM IGG+IGM AB [PRESENCE] IN SERUM OR PLASMA BY IMMUNOASSAY: NORMAL
UROBILINOGEN UR STRIP-ACNC: <2 MG/DL
VZV IGG SER QL IA: NORMAL
WBC # BLD AUTO: 5 THOUSAND/UL (ref 4.31–10.16)
WBC #/AREA URNS AUTO: ABNORMAL /HPF

## 2024-03-27 PROCEDURE — 86803 HEPATITIS C AB TEST: CPT

## 2024-03-27 PROCEDURE — 86850 RBC ANTIBODY SCREEN: CPT

## 2024-03-27 PROCEDURE — 87340 HEPATITIS B SURFACE AG IA: CPT

## 2024-03-27 PROCEDURE — 86901 BLOOD TYPING SEROLOGIC RH(D): CPT

## 2024-03-27 PROCEDURE — 87086 URINE CULTURE/COLONY COUNT: CPT

## 2024-03-27 PROCEDURE — 87389 HIV-1 AG W/HIV-1&-2 AB AG IA: CPT

## 2024-03-27 PROCEDURE — 86900 BLOOD TYPING SEROLOGIC ABO: CPT

## 2024-03-27 PROCEDURE — 86762 RUBELLA ANTIBODY: CPT

## 2024-03-27 PROCEDURE — 36415 COLL VENOUS BLD VENIPUNCTURE: CPT

## 2024-03-27 PROCEDURE — 85025 COMPLETE CBC W/AUTO DIFF WBC: CPT

## 2024-03-27 PROCEDURE — 81001 URINALYSIS AUTO W/SCOPE: CPT

## 2024-03-27 PROCEDURE — 86787 VARICELLA-ZOSTER ANTIBODY: CPT

## 2024-03-27 PROCEDURE — 86780 TREPONEMA PALLIDUM: CPT

## 2024-03-27 PROCEDURE — 82950 GLUCOSE TEST: CPT

## 2024-03-27 NOTE — TELEPHONE ENCOUNTER
Spoke with Lou at [facility] ([phone]).Insurance Authorization for NIPS genetic screening (86780) has been approved. Auth # is [id number] with dates of service 3/27/24-6/25/24.  Call reference # [id number].  Insurance Authorization is not a guarantee of payment and final determination is based on your member benefits, appropriateness of service provided and eligibility at time of services rendered and claim received. Please follow up with [facility] for your OOP copay.  Spoke with pt and provided her with above information.  Pt is requesting to call back to schedule- office number of [phone] provided.   No

## 2024-03-29 ENCOUNTER — APPOINTMENT (OUTPATIENT)
Dept: LAB | Facility: CLINIC | Age: 29
End: 2024-03-29
Payer: COMMERCIAL

## 2024-03-29 PROBLEM — O09.899 RUBELLA NON-IMMUNE STATUS, ANTEPARTUM: Status: ACTIVE | Noted: 2024-03-29

## 2024-03-29 PROBLEM — Z28.39 RUBELLA NON-IMMUNE STATUS, ANTEPARTUM: Status: ACTIVE | Noted: 2024-03-29

## 2024-03-29 LAB
ALBUMIN SERPL BCP-MCNC: 3.8 G/DL (ref 3.5–5)
ALP SERPL-CCNC: 57 U/L (ref 34–104)
ALT SERPL W P-5'-P-CCNC: 10 U/L (ref 7–52)
ANION GAP SERPL CALCULATED.3IONS-SCNC: 7 MMOL/L (ref 4–13)
AST SERPL W P-5'-P-CCNC: 15 U/L (ref 13–39)
BACTERIA UR CULT: NORMAL
BILIRUB SERPL-MCNC: 0.29 MG/DL (ref 0.2–1)
BUN SERPL-MCNC: 8 MG/DL (ref 5–25)
CALCIUM SERPL-MCNC: 9 MG/DL (ref 8.4–10.2)
CHLORIDE SERPL-SCNC: 103 MMOL/L (ref 96–108)
CHOLEST SERPL-MCNC: 174 MG/DL
CO2 SERPL-SCNC: 26 MMOL/L (ref 21–32)
CREAT SERPL-MCNC: 0.6 MG/DL (ref 0.6–1.3)
GFR SERPL CREATININE-BSD FRML MDRD: 124 ML/MIN/1.73SQ M
GLUCOSE P FAST SERPL-MCNC: 83 MG/DL (ref 65–99)
HDLC SERPL-MCNC: 51 MG/DL
LDLC SERPL CALC-MCNC: 105 MG/DL (ref 0–100)
POTASSIUM SERPL-SCNC: 4.1 MMOL/L (ref 3.5–5.3)
PROT SERPL-MCNC: 6.6 G/DL (ref 6.4–8.4)
SODIUM SERPL-SCNC: 136 MMOL/L (ref 135–147)
TRIGL SERPL-MCNC: 88 MG/DL

## 2024-03-29 PROCEDURE — 80053 COMPREHEN METABOLIC PANEL: CPT

## 2024-03-29 PROCEDURE — 36415 COLL VENOUS BLD VENIPUNCTURE: CPT

## 2024-03-29 PROCEDURE — 80061 LIPID PANEL: CPT

## 2024-04-02 ENCOUNTER — CLINICAL SUPPORT (OUTPATIENT)
Facility: HOSPITAL | Age: 29
End: 2024-04-02
Payer: COMMERCIAL

## 2024-04-02 DIAGNOSIS — Z36.9 UNSPECIFIED ANTENATAL SCREENING: Primary | ICD-10-CM

## 2024-04-02 DIAGNOSIS — Z33.1 PREGNANT STATE, INCIDENTAL: ICD-10-CM

## 2024-04-02 PROCEDURE — 36415 COLL VENOUS BLD VENIPUNCTURE: CPT | Performed by: OBSTETRICS & GYNECOLOGY

## 2024-04-02 NOTE — PROGRESS NOTES
Patient chose to have [facility] [id number] Non-Invasive Prenatal Screen [id number] PLUS w/ SCA, WITH fetal sex.  Patient choose to be billed through insurance.     Patient given brochure and is aware [facility] will contact patient's insurance and coordinate coverage.  Provided [facility] contact information. General inquiries [phone], Cost estimates [phone] and [facility] Billing [phone]. Website [email or web address].     Blood collection tubes labeled with patient identifiers (name, medical record number, and date of birth).     Filled out [facility] order form. Patient chose to have blood drawn in our office at time of visit. NIPS was drawn from left arm with a straight needle by KEDAR Rasmussen. .      If patient chose to have blood work drawn at a [facility] lab we requested patient notify MFM (via phone call or [employer] message) when blood collected so office can follow up on results.       Maternal Fetal Medicine will have results in approximately 5-7 business days and will call patient or notify via [employer].  Patient aware viewing lab result online will reveal fetal sex if ordered.    Patient verbalized understanding of all instructions and no questions at this time. ;a

## 2024-04-03 ENCOUNTER — APPOINTMENT (OUTPATIENT)
Dept: LAB | Facility: CLINIC | Age: 29
End: 2024-04-03
Payer: COMMERCIAL

## 2024-04-03 DIAGNOSIS — Z34.01 ENCOUNTER FOR SUPERVISION OF NORMAL FIRST PREGNANCY IN FIRST TRIMESTER: ICD-10-CM

## 2024-04-03 DIAGNOSIS — Z31.430 ENCOUNTER OF FEMALE FOR TESTING FOR GENETIC DISEASE CARRIER STATUS FOR PROCREATIVE MANAGEMENT: ICD-10-CM

## 2024-04-03 DIAGNOSIS — Z36.9 UNSPECIFIED ANTENATAL SCREENING: ICD-10-CM

## 2024-04-03 PROCEDURE — 36415 COLL VENOUS BLD VENIPUNCTURE: CPT

## 2024-04-08 LAB
CFDNA.FET/CFDNA.TOTAL SFR FETUS: NORMAL %
CITATION REF LAB TEST: NORMAL
FET 13+18+21+X+Y ANEUP PLAS.CFDNA: NEGATIVE
FET CHR 21 TS PLAS.CFDNA QL: NEGATIVE
FET CHR 21 TS PLAS.CFDNA QL: NEGATIVE
FET MS X RISK WBC.DNA+CFDNA QL: NOT DETECTED
FET SEX PLAS.CFDNA DOSAGE CFDNA: NORMAL
FET TS 13 RISK PLAS.CFDNA QL: NEGATIVE
FET X + Y ANEUP RISK PLAS.CFDNA SEQ-IMP: NOT DETECTED
GA EST FROM CONCEPTION DATE: NORMAL D
GESTATIONAL AGE > 9:: YES
LAB DIRECTOR NAME PROVIDER: NORMAL
LAB DIRECTOR NAME PROVIDER: NORMAL
LABORATORY COMMENT REPORT: NORMAL
LIMITATIONS OF THE TEST: NORMAL
NEGATIVE PREDICTIVE VALUE: NORMAL
PERFORMANCE CHARACTERISTICS: NORMAL
POSITIVE PREDICTIVE VALUE: NORMAL
REF LAB TEST METHOD: NORMAL
SL AMB NOTE:: NORMAL
TEST PERFORMANCE INFO SPEC: NORMAL

## 2024-04-18 ENCOUNTER — ROUTINE PRENATAL (OUTPATIENT)
Dept: OBGYN CLINIC | Facility: CLINIC | Age: 29
End: 2024-04-18
Payer: COMMERCIAL

## 2024-04-18 VITALS
HEART RATE: 94 BPM | SYSTOLIC BLOOD PRESSURE: 110 MMHG | DIASTOLIC BLOOD PRESSURE: 52 MMHG | BODY MASS INDEX: 33.93 KG/M2 | WEIGHT: 194.6 LBS

## 2024-04-18 DIAGNOSIS — Z36.9 ENCOUNTER FOR ANTENATAL SCREENING: ICD-10-CM

## 2024-04-18 DIAGNOSIS — O09.899 RUBELLA NON-IMMUNE STATUS, ANTEPARTUM: ICD-10-CM

## 2024-04-18 DIAGNOSIS — E66.8 OTHER OBESITY AFFECTING PREGNANCY, ANTEPARTUM: ICD-10-CM

## 2024-04-18 DIAGNOSIS — O99.210 OTHER OBESITY AFFECTING PREGNANCY, ANTEPARTUM: ICD-10-CM

## 2024-04-18 DIAGNOSIS — Z34.02 PRENATAL CARE, FIRST PREGNANCY, SECOND TRIMESTER: Primary | ICD-10-CM

## 2024-04-18 DIAGNOSIS — Z28.39 RUBELLA NON-IMMUNE STATUS, ANTEPARTUM: ICD-10-CM

## 2024-04-18 DIAGNOSIS — Z3A.16 16 WEEKS GESTATION OF PREGNANCY: ICD-10-CM

## 2024-04-18 LAB
SL AMB  POCT GLUCOSE, UA: NORMAL
SL AMB POCT URINE PROTEIN: NORMAL

## 2024-04-18 PROCEDURE — 81002 URINALYSIS NONAUTO W/O SCOPE: CPT | Performed by: PHYSICIAN ASSISTANT

## 2024-04-18 PROCEDURE — PNV: Performed by: PHYSICIAN ASSISTANT

## 2024-04-18 NOTE — PROGRESS NOTES
Patient here for prenatal visit.  16w/3d  She denies any complaints and is not feeling movement.   Order for AFP screen  PN labs completed.  Level II US scheduled 5/21/24  Urine neg for protein and glucose.

## 2024-04-18 NOTE — PROGRESS NOTES
Problem List Items Addressed This Visit       16 weeks gestation of pregnancy     Chelsea  is a 29 y.o.  @16w3d who presents for routine prenatal visit.    Denies OB complaints.     NIPT - low risk  AFP -  ordered   Level II - scheduled      TWG -0.635 kg (-1 lb 6.4 oz)    Has not yet appreciated fetal movement.  Denies contractions, cramping, leakage of fluid or vaginal bleeding.     Reviewed PTL precautions and reasons to call.           Obesity affecting pregnancy, antepartum     Normal early 1 hr.   Taking LDASA.  Reviewed diet and exercise recommendations.          Rubella non-immune status, antepartum     Other Visit Diagnoses       Prenatal care, first pregnancy, second trimester    -  Primary    Relevant Orders    Alpha fetoprotein, maternal    POCT urine dip (Completed)    Encounter for  screening        Relevant Orders    Alpha fetoprotein, maternal

## 2024-04-23 ENCOUNTER — APPOINTMENT (OUTPATIENT)
Dept: LAB | Facility: CLINIC | Age: 29
End: 2024-04-23
Payer: COMMERCIAL

## 2024-04-23 DIAGNOSIS — Z36.9 ENCOUNTER FOR ANTENATAL SCREENING: ICD-10-CM

## 2024-04-23 DIAGNOSIS — Z34.02 PRENATAL CARE, FIRST PREGNANCY, SECOND TRIMESTER: ICD-10-CM

## 2024-04-23 PROCEDURE — 36415 COLL VENOUS BLD VENIPUNCTURE: CPT

## 2024-04-23 PROCEDURE — 82105 ALPHA-FETOPROTEIN SERUM: CPT

## 2024-04-25 LAB
2ND TRIMESTER 4 SCREEN SERPL-IMP: NORMAL
AFP ADJ MOM SERPL: 1.27
AFP INTERP AMN-IMP: NORMAL
AFP INTERP SERPL-IMP: NORMAL
AFP INTERP SERPL-IMP: NORMAL
AFP SERPL-MCNC: 47.3 NG/ML
AGE AT DELIVERY: 28.6 YR
GA METHOD: NORMAL
GA: 17.3 WEEKS
IDDM PATIENT QL: NO
MULTIPLE PREGNANCY: NO
NEURAL TUBE DEFECT RISK FETUS: NORMAL %

## 2024-05-01 PROBLEM — J06.9 ACUTE URI: Status: RESOLVED | Noted: 2024-03-12 | Resolved: 2024-05-01

## 2024-05-03 ENCOUNTER — TELEPHONE (OUTPATIENT)
Dept: OBGYN CLINIC | Facility: CLINIC | Age: 29
End: 2024-05-03

## 2024-05-03 NOTE — TELEPHONE ENCOUNTER
----- Message from Nataly Grissom RN sent at 3/21/2024  8:44 AM EDT -----  Regardinnd trimester call  4/15-    Attempted to call pt, in order to complete 2nd trimester phone call.  LEFT MESSAGE for pt to return call .

## 2024-05-03 NOTE — TELEPHONE ENCOUNTER
Overall how are you doing? Good, but lower back pain.  Reviewed remedies to help.      Compliant with routine OB care appointments? Yes 5/16/24    Have you completed your 1st trimester labs? yes    If you had NIPS with MFM, do you have a order for MSAFP? yes   Can be completed 15w-22w9d, ideally 16w-18w    Have you seen MFM and do you have your detailed US scheduled? Yes 5/21/24    Pregnancy Education-have you had a chance to review the classes offered and registered? Yes  registered for classes.     EPDS Score 6     Pt would like to discuss with MD, decreasing  intensity of work load due to experiencing discomfort every evening .  Pt to main number,in order to attempt to reschedule her routine OB appt to next week,to discuss  concerns with provider.

## 2024-05-16 ENCOUNTER — ROUTINE PRENATAL (OUTPATIENT)
Dept: OBGYN CLINIC | Facility: CLINIC | Age: 29
End: 2024-05-16
Payer: COMMERCIAL

## 2024-05-16 VITALS
WEIGHT: 198.4 LBS | OXYGEN SATURATION: 100 % | DIASTOLIC BLOOD PRESSURE: 72 MMHG | BODY MASS INDEX: 34.59 KG/M2 | SYSTOLIC BLOOD PRESSURE: 110 MMHG | HEART RATE: 98 BPM

## 2024-05-16 DIAGNOSIS — Z3A.20 20 WEEKS GESTATION OF PREGNANCY: ICD-10-CM

## 2024-05-16 DIAGNOSIS — Z34.02 PRENATAL CARE, FIRST PREGNANCY, SECOND TRIMESTER: Primary | ICD-10-CM

## 2024-05-16 DIAGNOSIS — O99.210 OBESITY AFFECTING PREGNANCY, ANTEPARTUM, UNSPECIFIED OBESITY TYPE: ICD-10-CM

## 2024-05-16 LAB
SL AMB  POCT GLUCOSE, UA: NORMAL
SL AMB POCT URINE PROTEIN: NORMAL

## 2024-05-16 PROCEDURE — PNV: Performed by: STUDENT IN AN ORGANIZED HEALTH CARE EDUCATION/TRAINING PROGRAM

## 2024-05-16 PROCEDURE — 81002 URINALYSIS NONAUTO W/O SCOPE: CPT | Performed by: STUDENT IN AN ORGANIZED HEALTH CARE EDUCATION/TRAINING PROGRAM

## 2024-05-16 RX ORDER — CETIRIZINE HYDROCHLORIDE 10 MG/1
10 TABLET ORAL DAILY
COMMUNITY

## 2024-05-16 NOTE — ASSESSMENT & PLAN NOTE
30yo  at 20.3wks presents for routine prenatal visit     Pt denies contractions, vaginal bleeding, leakage of fluid. Endorses fetal movement.     Pt endorses increased back pain and bilateral leg swelling with long shifts at work. Pt to start wearing compression socks and belly band. Can take tylenol prn.     -continue PNVs   -AFP negative   -level II US scheduled 24  - labor precautions provided   -return in 4 weeks

## 2024-05-16 NOTE — PROGRESS NOTES
20 weeks gestation of pregnancy  30yo  at 20.3wks presents for routine prenatal visit     Pt denies contractions, vaginal bleeding, leakage of fluid. Endorses fetal movement.     Pt endorses increased back pain and bilateral leg swelling with long shifts at work. Pt to start wearing compression socks and belly band. Can take tylenol prn.     -continue PNVs   -AFP negative   -level II US scheduled 24  - labor precautions provided   -return in 4 weeks     Obesity affecting pregnancy, antepartum  -continue ASA

## 2024-05-21 ENCOUNTER — ROUTINE PRENATAL (OUTPATIENT)
Facility: HOSPITAL | Age: 29
End: 2024-05-21
Payer: COMMERCIAL

## 2024-05-21 VITALS
BODY MASS INDEX: 33.97 KG/M2 | SYSTOLIC BLOOD PRESSURE: 118 MMHG | HEIGHT: 64 IN | WEIGHT: 199 LBS | DIASTOLIC BLOOD PRESSURE: 64 MMHG | HEART RATE: 94 BPM

## 2024-05-21 DIAGNOSIS — Z36.3 ENCOUNTER FOR ANTENATAL SCREENING FOR MALFORMATION: ICD-10-CM

## 2024-05-21 DIAGNOSIS — Z3A.21 21 WEEKS GESTATION OF PREGNANCY: Primary | ICD-10-CM

## 2024-05-21 DIAGNOSIS — E66.8 OTHER OBESITY AFFECTING PREGNANCY, ANTEPARTUM: ICD-10-CM

## 2024-05-21 DIAGNOSIS — Z36.86 ENCOUNTER FOR ANTENATAL SCREENING FOR CERVICAL LENGTH: ICD-10-CM

## 2024-05-21 DIAGNOSIS — O99.210 OTHER OBESITY AFFECTING PREGNANCY, ANTEPARTUM: ICD-10-CM

## 2024-05-21 PROCEDURE — 76817 TRANSVAGINAL US OBSTETRIC: CPT | Performed by: OBSTETRICS & GYNECOLOGY

## 2024-05-21 PROCEDURE — 76811 OB US DETAILED SNGL FETUS: CPT | Performed by: OBSTETRICS & GYNECOLOGY

## 2024-05-21 PROCEDURE — 99213 OFFICE O/P EST LOW 20 MIN: CPT | Performed by: OBSTETRICS & GYNECOLOGY

## 2024-05-21 NOTE — LETTER
"May 21, 2024     Kacey Lopez MD  [address]  [address]  [place] [address]    Patient: Chelsea Garcia   YOB: 1995   Date of Visit: 2024       Dear Dr. Lopez:    Thank you for referring Chelsea Garcia to me for evaluation. Below are my notes for this consultation.    If you have questions, please do not hesitate to call me. I look forward to following your patient along with you.         Sincerely,        Alyssa Feliciano MD        CC: No Recipients    Alyssa Feliciano MD  2024  9:26 AM  Sign when Signing Visit  [facility]: Chelsea Garcia was seen today at 21w1d for anatomic survey and cervical length screening ultrasound.  See ultrasound report under \"OB Procedures\" tab.  Please don't hesitate to contact our office with any concerns or questions.  -Alyssa Feliciano MD    "

## 2024-05-21 NOTE — PROGRESS NOTES
"[facility]: Chelsea Garcia was seen today at 21w1d for anatomic survey and cervical length screening ultrasound.  See ultrasound report under \"OB Procedures\" tab.  Please don't hesitate to contact our office with any concerns or questions.  -Alyssa Feliciano MD    "

## 2024-06-08 ENCOUNTER — CLINICAL SUPPORT (OUTPATIENT)
Age: 29
End: 2024-06-08

## 2024-06-08 DIAGNOSIS — Z32.2 ENCOUNTER FOR CHILDBIRTH INSTRUCTION: Primary | ICD-10-CM

## 2024-06-13 ENCOUNTER — ROUTINE PRENATAL (OUTPATIENT)
Dept: OBGYN CLINIC | Facility: CLINIC | Age: 29
End: 2024-06-13

## 2024-06-13 VITALS
SYSTOLIC BLOOD PRESSURE: 112 MMHG | WEIGHT: 197.8 LBS | HEART RATE: 90 BPM | BODY MASS INDEX: 34.49 KG/M2 | OXYGEN SATURATION: 98 % | DIASTOLIC BLOOD PRESSURE: 60 MMHG

## 2024-06-13 DIAGNOSIS — Z3A.24 24 WEEKS GESTATION OF PREGNANCY: ICD-10-CM

## 2024-06-13 DIAGNOSIS — O99.210 OTHER OBESITY AFFECTING PREGNANCY, ANTEPARTUM: ICD-10-CM

## 2024-06-13 DIAGNOSIS — Z34.02 PRENATAL CARE, FIRST PREGNANCY, SECOND TRIMESTER: Primary | ICD-10-CM

## 2024-06-13 DIAGNOSIS — Z28.39 RUBELLA NON-IMMUNE STATUS, ANTEPARTUM: ICD-10-CM

## 2024-06-13 DIAGNOSIS — O09.899 RUBELLA NON-IMMUNE STATUS, ANTEPARTUM: ICD-10-CM

## 2024-06-13 DIAGNOSIS — E66.8 OTHER OBESITY AFFECTING PREGNANCY, ANTEPARTUM: ICD-10-CM

## 2024-06-13 PROCEDURE — PNV: Performed by: OBSTETRICS & GYNECOLOGY

## 2024-06-13 NOTE — ASSESSMENT & PLAN NOTE
28 week labs given and explained.  Has PN education scheduled.  Discussed routine aches/pains of pregnancy.     precautions given.

## 2024-06-13 NOTE — PROGRESS NOTES
Prenatal visit @ 24w3d. Denies ctxs, VB, LOF.  Good FM. Using a belly band for support which helps with pelvic pressure.  Cramping at night - reassurance given as likely related to full bladder.  Swelling in LE improved with compression stockings - pre-eclampsia precautions given.     Problem List Items Addressed This Visit       24 weeks gestation of pregnancy     28 week labs given and explained.  Has PN education scheduled.  Discussed routine aches/pains of pregnancy.     precautions given.          Obesity affecting pregnancy, antepartum     TWG 3#.   Continue ASA.  Plan for 32 week growth scan.          Rubella non-immune status, antepartum     Offer MMR postpartum.          Other Visit Diagnoses       Prenatal care, first pregnancy, second trimester    -  Primary    Relevant Orders    Anemia Panel w/Reflex, OB    CBC and differential    Glucose, 1H PG    RPR-Syphilis Screening (Total Syphilis IGG/IGM)

## 2024-07-02 ENCOUNTER — CLINICAL SUPPORT (OUTPATIENT)
Age: 29
End: 2024-07-02

## 2024-07-02 DIAGNOSIS — Z32.2 ENCOUNTER FOR CHILDBIRTH INSTRUCTION: Primary | ICD-10-CM

## 2024-07-03 ENCOUNTER — APPOINTMENT (OUTPATIENT)
Dept: LAB | Facility: HOSPITAL | Age: 29
End: 2024-07-03
Payer: COMMERCIAL

## 2024-07-03 DIAGNOSIS — Z34.02 PRENATAL CARE, FIRST PREGNANCY, SECOND TRIMESTER: ICD-10-CM

## 2024-07-03 LAB
BASOPHILS # BLD AUTO: 0.01 THOUSANDS/ÂΜL (ref 0–0.1)
BASOPHILS NFR BLD AUTO: 0 % (ref 0–1)
EOSINOPHIL # BLD AUTO: 0.07 THOUSAND/ÂΜL (ref 0–0.61)
EOSINOPHIL NFR BLD AUTO: 1 % (ref 0–6)
ERYTHROCYTE [DISTWIDTH] IN BLOOD BY AUTOMATED COUNT: 13.7 % (ref 11.6–15.1)
FERRITIN SERPL-MCNC: 22 NG/ML (ref 11–307)
HCT VFR BLD AUTO: 32.3 % (ref 34.8–46.1)
HGB BLD-MCNC: 10.3 G/DL (ref 11.5–15.4)
IMM GRANULOCYTES # BLD AUTO: 0.04 THOUSAND/UL (ref 0–0.2)
IMM GRANULOCYTES NFR BLD AUTO: 0 % (ref 0–2)
LYMPHOCYTES # BLD AUTO: 1.73 THOUSANDS/ÂΜL (ref 0.6–4.47)
LYMPHOCYTES NFR BLD AUTO: 19 % (ref 14–44)
MCH RBC QN AUTO: 26.3 PG (ref 26.8–34.3)
MCHC RBC AUTO-ENTMCNC: 31.9 G/DL (ref 31.4–37.4)
MCV RBC AUTO: 82 FL (ref 82–98)
MONOCYTES # BLD AUTO: 0.77 THOUSAND/ÂΜL (ref 0.17–1.22)
MONOCYTES NFR BLD AUTO: 9 % (ref 4–12)
NEUTROPHILS # BLD AUTO: 6.44 THOUSANDS/ÂΜL (ref 1.85–7.62)
NEUTS SEG NFR BLD AUTO: 71 % (ref 43–75)
NRBC BLD AUTO-RTO: 0 /100 WBCS
PLATELET # BLD AUTO: 274 THOUSANDS/UL (ref 149–390)
PMV BLD AUTO: 10.9 FL (ref 8.9–12.7)
RBC # BLD AUTO: 3.92 MILLION/UL (ref 3.81–5.12)
TREPONEMA PALLIDUM IGG+IGM AB [PRESENCE] IN SERUM OR PLASMA BY IMMUNOASSAY: NORMAL
WBC # BLD AUTO: 9.06 THOUSAND/UL (ref 4.31–10.16)

## 2024-07-03 PROCEDURE — 82728 ASSAY OF FERRITIN: CPT

## 2024-07-03 PROCEDURE — 86780 TREPONEMA PALLIDUM: CPT

## 2024-07-03 PROCEDURE — 85025 COMPLETE CBC W/AUTO DIFF WBC: CPT

## 2024-07-03 PROCEDURE — 36415 COLL VENOUS BLD VENIPUNCTURE: CPT

## 2024-07-08 ENCOUNTER — TELEPHONE (OUTPATIENT)
Age: 29
End: 2024-07-08

## 2024-07-08 NOTE — TELEPHONE ENCOUNTER
Pt called regarding questions related to order for 1H Glucose test. Pt states she had completed 1 hr test on 3/27, which does appear normal in her chart. Pt has an additional active order for another 1H Glucose test. She is inquiring if this will be needed, and reasoning to why. Thank you.

## 2024-07-12 ENCOUNTER — ROUTINE PRENATAL (OUTPATIENT)
Dept: OBGYN CLINIC | Facility: CLINIC | Age: 29
End: 2024-07-12
Payer: COMMERCIAL

## 2024-07-12 VITALS
OXYGEN SATURATION: 99 % | BODY MASS INDEX: 35.05 KG/M2 | WEIGHT: 201 LBS | SYSTOLIC BLOOD PRESSURE: 144 MMHG | HEART RATE: 98 BPM | DIASTOLIC BLOOD PRESSURE: 76 MMHG

## 2024-07-12 DIAGNOSIS — Z28.39 RUBELLA NON-IMMUNE STATUS, ANTEPARTUM: ICD-10-CM

## 2024-07-12 DIAGNOSIS — E66.8 OTHER OBESITY AFFECTING PREGNANCY, ANTEPARTUM: ICD-10-CM

## 2024-07-12 DIAGNOSIS — D50.9 IRON DEFICIENCY ANEMIA, UNSPECIFIED IRON DEFICIENCY ANEMIA TYPE: ICD-10-CM

## 2024-07-12 DIAGNOSIS — Z3A.28 28 WEEKS GESTATION OF PREGNANCY: ICD-10-CM

## 2024-07-12 DIAGNOSIS — O09.899 RUBELLA NON-IMMUNE STATUS, ANTEPARTUM: ICD-10-CM

## 2024-07-12 DIAGNOSIS — O99.210 OTHER OBESITY AFFECTING PREGNANCY, ANTEPARTUM: ICD-10-CM

## 2024-07-12 DIAGNOSIS — Z34.03 ENCOUNTER FOR SUPERVISION OF NORMAL FIRST PREGNANCY IN THIRD TRIMESTER: Primary | ICD-10-CM

## 2024-07-12 LAB
DME PARACHUTE DELIVERY DATE REQUESTED: NORMAL
DME PARACHUTE ITEM DESCRIPTION: NORMAL
DME PARACHUTE ORDER STATUS: NORMAL
DME PARACHUTE SUPPLIER NAME: NORMAL
DME PARACHUTE SUPPLIER PHONE: NORMAL
SL AMB  POCT GLUCOSE, UA: NORMAL
SL AMB POCT URINE PROTEIN: NORMAL

## 2024-07-12 PROCEDURE — 81002 URINALYSIS NONAUTO W/O SCOPE: CPT | Performed by: OBSTETRICS & GYNECOLOGY

## 2024-07-12 PROCEDURE — PNV: Performed by: OBSTETRICS & GYNECOLOGY

## 2024-07-12 NOTE — PROGRESS NOTES
Prenatal visit @ 28 4/7wks.  Denies ctxs, VB, LOF. Good FM.  Works on a [employer] - reports it is starting to get uncomfortable getting in and out.  Encouraged her to discuss this with her supervisors.    Problem List Items Addressed This Visit       28 weeks gestation of pregnancy     Peds referral placed.  Yellow folder given - aware RNs will call to review.  Breast pump ordered.  28 week labs reviewed - anemia present, though mild.  1h GCT not yet completed - patient aware and will do this weekend.   Various routes of delivery reviewed including risks/benefits of each.  All questions answered, and consent signed.   and pre-eclampsia precautions given.          Obesity affecting pregnancy, antepartum     TWG 2#.   Continue ASA.  Plan for 32 week growth scan.          Rubella non-immune status, antepartum     Offer MMR postpartum.          Iron deficiency anemia     Hgb 10.3 at 28 week labs.  Encouraged patient to restart oral iron.           Other Visit Diagnoses       Encounter for supervision of normal first pregnancy in third trimester    -  Primary    Relevant Orders    Ambulatory Referral to Pediatrics    POCT urine dip (Completed)

## 2024-07-12 NOTE — ASSESSMENT & PLAN NOTE
Peds referral placed.  Yellow folder given - aware RNs will call to review.  Breast pump ordered.  28 week labs reviewed - anemia present, though mild.  1h GCT not yet completed - patient aware and will do this weekend.   Various routes of delivery reviewed including risks/benefits of each.  All questions answered, and consent signed.   and pre-eclampsia precautions given.

## 2024-07-15 ENCOUNTER — TELEPHONE (OUTPATIENT)
Age: 29
End: 2024-07-15

## 2024-07-15 NOTE — TELEPHONE ENCOUNTER
Pt called asking for doctor's note, she's 7 months pregnant.  At work she is on feet too much having trouble walking. Would like drs note to get new assignment at work with less demand for being on her feet.  She is also reaching out to OB for a second note as well- please review and can upload in [employer]

## 2024-07-16 LAB
DME PARACHUTE DELIVERY DATE ACTUAL: NORMAL
DME PARACHUTE DELIVERY DATE REQUESTED: NORMAL
DME PARACHUTE ITEM DESCRIPTION: NORMAL
DME PARACHUTE ORDER STATUS: NORMAL
DME PARACHUTE SUPPLIER NAME: NORMAL
DME PARACHUTE SUPPLIER PHONE: NORMAL

## 2024-07-17 ENCOUNTER — OFFICE VISIT (OUTPATIENT)
Dept: INTERNAL MEDICINE CLINIC | Facility: CLINIC | Age: 29
End: 2024-07-17
Payer: COMMERCIAL

## 2024-07-17 DIAGNOSIS — M54.50 ACUTE BILATERAL LOW BACK PAIN WITHOUT SCIATICA: ICD-10-CM

## 2024-07-17 DIAGNOSIS — R60.0 BILATERAL LOWER EXTREMITY EDEMA: Primary | ICD-10-CM

## 2024-07-17 PROCEDURE — 99441 PR PHYS/QHP TELEPHONE EVALUATION 5-10 MIN: CPT | Performed by: INTERNAL MEDICINE

## 2024-07-17 NOTE — PROGRESS NOTES
Virtual Brief Visit  Name: Chelsea Garcia      : 1995      MRN: [id number]  Encounter Provider: Marck Lawton MD  Encounter Date: 2024   Encounter department: [facility]    This Visit is being completed by telephone. The Patient is located at Home and in the following state in which I hold an active license [place]    The patient was identified by name and date of birth. Chelsea Garcia was informed that this is a telemedicine visit and that the visit is being conducted through the Epic Embedded platform. She agrees to proceed..  My office door was closed. No one else was in the room.  She acknowledged consent and understanding of privacy and security of the video platform. The patient has agreed to participate and understands they can discontinue the visit at any time.    Patient is aware this is a billable service.     Assessment & Plan   1. Bilateral lower extremity edema  2. Acute bilateral low back pain without sciatica  -In the setting of pregnancy.  Patient to continue wearing compression stockings and applying warm compresses to her low back as needed.  Also discussed potential physical therapy for her low back pain as well.  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       History of Present Illness   HPI  Patient presents today requesting a note for her to feel the packing side of her job description.  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  She is currently 29 weeks pregnant and has been experiencing leg swelling, knee pain and low back pain.  She has been wearing a belly band and compression stockings to help with her symptoms.    Visit Time  Total Visit Duration: 8 min

## 2024-07-17 NOTE — LETTER
July 17, 2024     Patient: Chelsea Garcia  YOB: 1995  Date of Visit: 7/17/2024      To Whom it May Concern:    Chelsea Garcia is under my professional care. Chelsea was seen in my office on via virtual visit 7/17/2024.  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  This would allow for her to be off of her feet. She is currently pregnant and has been experiencing lower extremity swelling which leads to leg heaviness causing knee and low back pain.       If you have any questions or concerns, please don't hesitate to call.         Sincerely,          Marck Lawton MD         Reason for Call:  Medication or medication refill:    Do you use a [facility]?  Name of the pharmacy and phone number for the current request:  [facility]    Name of the medication requested: Coreg    Other request: Patient is out of medication and he has a new pharmacy    Can we leave a detailed message on this number? NO    Phone number patient can be reached at: Cell number on file:    Telephone Information:   Mobile [phone]       Best Time:     Call taken on 11/20/2018 at 4:22 PM by Emeli Pompa

## 2024-07-19 ENCOUNTER — CLINICAL SUPPORT (OUTPATIENT)
Dept: POSTPARTUM | Facility: CLINIC | Age: 29
End: 2024-07-19

## 2024-07-19 DIAGNOSIS — Z32.2 ENCOUNTER FOR CHILDBIRTH INSTRUCTION: Primary | ICD-10-CM

## 2024-07-21 PROBLEM — Z3A.30 30 WEEKS GESTATION OF PREGNANCY: Status: ACTIVE | Noted: 2024-03-12

## 2024-07-21 PROBLEM — Z34.03 ENCOUNTER FOR SUPERVISION OF NORMAL FIRST PREGNANCY IN THIRD TRIMESTER: Status: ACTIVE | Noted: 2024-07-21

## 2024-07-22 ENCOUNTER — ROUTINE PRENATAL (OUTPATIENT)
Dept: OBGYN CLINIC | Facility: CLINIC | Age: 29
End: 2024-07-22
Payer: COMMERCIAL

## 2024-07-22 VITALS
DIASTOLIC BLOOD PRESSURE: 54 MMHG | WEIGHT: 199.8 LBS | BODY MASS INDEX: 34.84 KG/M2 | SYSTOLIC BLOOD PRESSURE: 116 MMHG | HEART RATE: 86 BPM

## 2024-07-22 DIAGNOSIS — Z23 NEED FOR TDAP VACCINATION: ICD-10-CM

## 2024-07-22 DIAGNOSIS — O09.899 RUBELLA NON-IMMUNE STATUS, ANTEPARTUM: ICD-10-CM

## 2024-07-22 DIAGNOSIS — Z3A.30 30 WEEKS GESTATION OF PREGNANCY: ICD-10-CM

## 2024-07-22 DIAGNOSIS — Z28.39 RUBELLA NON-IMMUNE STATUS, ANTEPARTUM: ICD-10-CM

## 2024-07-22 DIAGNOSIS — D50.9 IRON DEFICIENCY ANEMIA, UNSPECIFIED IRON DEFICIENCY ANEMIA TYPE: ICD-10-CM

## 2024-07-22 DIAGNOSIS — E66.8 OTHER OBESITY AFFECTING PREGNANCY, ANTEPARTUM: ICD-10-CM

## 2024-07-22 DIAGNOSIS — O99.210 OTHER OBESITY AFFECTING PREGNANCY, ANTEPARTUM: ICD-10-CM

## 2024-07-22 DIAGNOSIS — Z34.03 ENCOUNTER FOR SUPERVISION OF NORMAL FIRST PREGNANCY IN THIRD TRIMESTER: Primary | ICD-10-CM

## 2024-07-22 LAB
SL AMB  POCT GLUCOSE, UA: NEGATIVE
SL AMB POCT URINE PROTEIN: NEGATIVE

## 2024-07-22 PROCEDURE — 90471 IMMUNIZATION ADMIN: CPT | Performed by: NURSE PRACTITIONER

## 2024-07-22 PROCEDURE — 81002 URINALYSIS NONAUTO W/O SCOPE: CPT | Performed by: NURSE PRACTITIONER

## 2024-07-22 PROCEDURE — 90715 TDAP VACCINE 7 YRS/> IM: CPT | Performed by: NURSE PRACTITIONER

## 2024-07-22 PROCEDURE — PNV: Performed by: NURSE PRACTITIONER

## 2024-07-22 NOTE — PROGRESS NOTES
OB-30w0d.   Denies cramping, bleeding, leakage of fluid. Normal fetal movement.  Delivery consent-signed last visit.   Birth plan paperwork next visit  Tdap- desired today  Growth 8/6/24-check if cancelled- sticky next to it  Prenatal education classes scheduled.   Researching pediatricians

## 2024-07-22 NOTE — ASSESSMENT & PLAN NOTE
TWG # 1.724 kg (3 lb 12.8 oz)  GCT is outstaning. Encouraged to complete this week.  Limit weight gain to 11-20 lbs.   Regular exercise encouraged.   Low dose  mg po daily encouraged to reduce risk of pre E and adverse pregnancy outcomes until 36 weeks.  Fetal growth scans every 4-6 weeks in 3rd trimester.

## 2024-07-22 NOTE — ASSESSMENT & PLAN NOTE
"Chelsea Garcia is a 29 y.o.  here for OB visit at 30w0d weeks accompanied by FOB/ Dino. TWG 1.724 kg (3 lb 12.8 oz).  No health concerns.     Denies LOF, vaginal bleeding or contractions.   Performing FKC's daily 10 in 2 hours  28 week labs showed mild anemia. See separate diagnosis.   GCT is outstanding. Encouraged to complete this week.   TDAP received today.   Rhogam not indicated  MFM appt on 24  Pediatric CPR, childbirth,  education, breast feeding classes along with hospital tour recommended; they are scheduled for multiple classes.   \"It's a girl!\"  Will return birth plan at next visit.   RTO 2 weeks  "

## 2024-07-22 NOTE — PROGRESS NOTES
"Problem   Iron Deficiency Anemia   Rubella Non-Immune Status, Antepartum    Plan for MMR postpartum     Obesity Affecting Pregnancy, Antepartum    Normal early glucola  LD ASA     30 Weeks Gestation of Pregnancy    First OB labs - completed, rubella non immune. Otherwise WNL.   Gc/chlamydia - 3/21/24 neg  Pap - 3/21/24 NILM  Blue folder - has     NIPT low risk   AFP - ordered   NT scan completed  Level II scheduled     28 week labs -  COVID vaccine -   TDAP -   Delivery consent - X  Yellow folder -X     Breast pump - X  Pediatrician - X  Perineal massage -   GBS -            30 weeks gestation of pregnancy  Chelsea Garcia is a 29 y.o.  here for OB visit at 30w0d weeks accompanied by FOB/ Dino. TWG 1.724 kg (3 lb 12.8 oz).  No health concerns.     Denies LOF, vaginal bleeding or contractions.   Performing FKC's daily 10 in 2 hours  28 week labs showed mild anemia. See separate diagnosis.   GCT is outstanding. Encouraged to complete this week.   TDAP received today.   Rhogam not indicated  MFM appt on 24  Pediatric CPR, childbirth,  education, breast feeding classes along with hospital tour recommended; they are scheduled for multiple classes.   \"It's a girl!\"  Will return birth plan at next visit.   RTO 2 weeks    Obesity affecting pregnancy, antepartum  TWG # 1.724 kg (3 lb 12.8 oz)  GCT is outstaning. Encouraged to complete this week.  Limit weight gain to 11-20 lbs.   Regular exercise encouraged.   Low dose  mg po daily encouraged to reduce risk of pre E and adverse pregnancy outcomes until 36 weeks.  Fetal growth scans every 4-6 weeks in 3rd trimester.    Rubella non-immune status, antepartum  Postpartum MMR recommended.     Iron deficiency anemia  7/3/24 H/H 10.3/32.3. Ferritin 22.  Iron supplementation started 2 weeks ago.   CBC follow up ordered.       "

## 2024-07-22 NOTE — ASSESSMENT & PLAN NOTE
7/3/24 H/H 10.3/32.3. Ferritin 22.  Iron supplementation started 2 weeks ago.   CBC follow up ordered.

## 2024-07-24 ENCOUNTER — CLINICAL SUPPORT (OUTPATIENT)
Age: 29
End: 2024-07-24

## 2024-07-24 DIAGNOSIS — Z32.2 ENCOUNTER FOR CHILDBIRTH INSTRUCTION: Primary | ICD-10-CM

## 2024-07-25 ENCOUNTER — APPOINTMENT (OUTPATIENT)
Dept: LAB | Facility: AMBULARY SURGERY CENTER | Age: 29
End: 2024-07-25
Payer: COMMERCIAL

## 2024-07-25 DIAGNOSIS — Z34.02 PRENATAL CARE, FIRST PREGNANCY, SECOND TRIMESTER: ICD-10-CM

## 2024-07-25 LAB — GLUCOSE 1H P 50 G GLC PO SERPL-MCNC: 89 MG/DL (ref 70–134)

## 2024-07-25 PROCEDURE — 82950 GLUCOSE TEST: CPT

## 2024-07-25 PROCEDURE — 36415 COLL VENOUS BLD VENIPUNCTURE: CPT

## 2024-08-01 ENCOUNTER — ROUTINE PRENATAL (OUTPATIENT)
Dept: OBGYN CLINIC | Facility: MEDICAL CENTER | Age: 29
End: 2024-08-01
Payer: COMMERCIAL

## 2024-08-01 VITALS
BODY MASS INDEX: 35.12 KG/M2 | DIASTOLIC BLOOD PRESSURE: 74 MMHG | HEART RATE: 102 BPM | SYSTOLIC BLOOD PRESSURE: 122 MMHG | OXYGEN SATURATION: 100 % | WEIGHT: 201.4 LBS

## 2024-08-01 DIAGNOSIS — Z34.03 ENCOUNTER FOR SUPERVISION OF NORMAL FIRST PREGNANCY IN THIRD TRIMESTER: Primary | ICD-10-CM

## 2024-08-01 DIAGNOSIS — Z3A.31 31 WEEKS GESTATION OF PREGNANCY: ICD-10-CM

## 2024-08-01 DIAGNOSIS — D50.8 OTHER IRON DEFICIENCY ANEMIA: ICD-10-CM

## 2024-08-01 DIAGNOSIS — O99.210 OBESITY AFFECTING PREGNANCY, ANTEPARTUM, UNSPECIFIED OBESITY TYPE: ICD-10-CM

## 2024-08-01 LAB
SL AMB  POCT GLUCOSE, UA: NEGATIVE
SL AMB POCT URINE PROTEIN: NEGATIVE

## 2024-08-01 PROCEDURE — PNV: Performed by: PHYSICIAN ASSISTANT

## 2024-08-01 PROCEDURE — 81002 URINALYSIS NONAUTO W/O SCOPE: CPT | Performed by: PHYSICIAN ASSISTANT

## 2024-08-01 NOTE — PROGRESS NOTES
Patient here for prenatal visit.  GA: 31w3d    Denies leaking of fluid, bleeding, cramping  Normal Fetal Movement  Labs-CBC active  Tdap-Utd  Delivery Consent signed 7/15/24  Breast Pump ordered 24  Peds referral placed 24  No concerns at this time.

## 2024-08-01 NOTE — LETTER
August 1, 2024     Patient: Chelsea Garcia  YOB: 1995  Date of Visit: 8/1/2024      To Whom it May Concern:    Chelsea Garcia is under my professional care. Chelsea was seen in my office on 8/1/2024. Chelsea is 31 weeks gestation with an estimated due date of 9/30/2024. She is approved from a medical standpoint to  overtime shifts.    If you have any questions or concerns, please don't hesitate to call.         Sincerely,          Romy Huynh PA-C        CC: No Recipients

## 2024-08-02 NOTE — ASSESSMENT & PLAN NOTE
Chelsea  is a 29 y.o.  @31w4d who presents for routine prenatal visit.      Requests note to be allowed to work overtime at her current job.  Patient states her job is restricting over time for pregnant individuals.  She feels comfortable working some overtime shifts.  Reviewed from a medical standpoint she is cleared to work overtime.  Note provided    28 wk labs - mild anemia; taking PO iron; due for repeat CBC- reminded to complete; RPR and 1 hour GTT wnl  Growth scan - scheduled  TDAP up to date  Consents in media  Will return plan at her next visit    Reports good fetal movement.  Denies LOF, vaginal bleeding, regular uterine contractions, cramping, headaches or visual changes.      Reviewed PTL/Labor precautions and FKC.

## 2024-08-02 NOTE — PROGRESS NOTES
31 weeks gestation of pregnancy  Chelsea  is a 29 y.o.  @31w4d who presents for routine prenatal visit.      Requests note to be allowed to work overtime at her current job.  Patient states her job is restricting over time for pregnant individuals.  She feels comfortable working some overtime shifts.  Reviewed from a medical standpoint she is cleared to work overtime.  Note provided    28 wk labs - mild anemia; taking PO iron; due for repeat CBC- reminded to complete; RPR and 1 hour GTT wnl  Growth scan - scheduled  TDAP up to date  Consents in media  Will return plan at her next visit    Reports good fetal movement.  Denies LOF, vaginal bleeding, regular uterine contractions, cramping, headaches or visual changes.      Reviewed PTL/Labor precautions and FKC.        Obesity affecting pregnancy, antepartum  Taking ASA  Third trimester growth ultrasound scheduled    Iron deficiency anemia  Taking p.o. iron  Reminded to complete updated CBC

## 2024-08-03 ENCOUNTER — CLINICAL SUPPORT (OUTPATIENT)
Age: 29
End: 2024-08-03

## 2024-08-03 DIAGNOSIS — Z32.2 ENCOUNTER FOR CHILDBIRTH INSTRUCTION: Primary | ICD-10-CM

## 2024-08-06 ENCOUNTER — APPOINTMENT (OUTPATIENT)
Dept: LAB | Facility: AMBULARY SURGERY CENTER | Age: 29
End: 2024-08-06
Payer: COMMERCIAL

## 2024-08-06 ENCOUNTER — ULTRASOUND (OUTPATIENT)
Facility: HOSPITAL | Age: 29
End: 2024-08-06
Payer: COMMERCIAL

## 2024-08-06 VITALS
DIASTOLIC BLOOD PRESSURE: 60 MMHG | WEIGHT: 202.2 LBS | BODY MASS INDEX: 34.52 KG/M2 | OXYGEN SATURATION: 98 % | HEART RATE: 87 BPM | HEIGHT: 64 IN | SYSTOLIC BLOOD PRESSURE: 110 MMHG

## 2024-08-06 DIAGNOSIS — Z3A.30 30 WEEKS GESTATION OF PREGNANCY: ICD-10-CM

## 2024-08-06 DIAGNOSIS — Z3A.32 32 WEEKS GESTATION OF PREGNANCY: ICD-10-CM

## 2024-08-06 DIAGNOSIS — Z36.89 ENCOUNTER FOR ULTRASOUND TO CHECK FETAL GROWTH: ICD-10-CM

## 2024-08-06 DIAGNOSIS — O99.210 OTHER OBESITY DUE TO EXCESS CALORIES AFFECTING PREGNANCY, ANTEPARTUM: Primary | ICD-10-CM

## 2024-08-06 DIAGNOSIS — D50.9 IRON DEFICIENCY ANEMIA, UNSPECIFIED IRON DEFICIENCY ANEMIA TYPE: ICD-10-CM

## 2024-08-06 DIAGNOSIS — E66.09 OTHER OBESITY DUE TO EXCESS CALORIES AFFECTING PREGNANCY, ANTEPARTUM: Primary | ICD-10-CM

## 2024-08-06 LAB
BASOPHILS # BLD AUTO: 0.02 THOUSANDS/ÂΜL (ref 0–0.1)
BASOPHILS NFR BLD AUTO: 0 % (ref 0–1)
EOSINOPHIL # BLD AUTO: 0.07 THOUSAND/ÂΜL (ref 0–0.61)
EOSINOPHIL NFR BLD AUTO: 1 % (ref 0–6)
ERYTHROCYTE [DISTWIDTH] IN BLOOD BY AUTOMATED COUNT: 13.6 % (ref 11.6–15.1)
HCT VFR BLD AUTO: 33.8 % (ref 34.8–46.1)
HGB BLD-MCNC: 10.7 G/DL (ref 11.5–15.4)
IMM GRANULOCYTES # BLD AUTO: 0.04 THOUSAND/UL (ref 0–0.2)
IMM GRANULOCYTES NFR BLD AUTO: 1 % (ref 0–2)
LYMPHOCYTES # BLD AUTO: 1.94 THOUSANDS/ÂΜL (ref 0.6–4.47)
LYMPHOCYTES NFR BLD AUTO: 23 % (ref 14–44)
MCH RBC QN AUTO: 26.3 PG (ref 26.8–34.3)
MCHC RBC AUTO-ENTMCNC: 31.7 G/DL (ref 31.4–37.4)
MCV RBC AUTO: 83 FL (ref 82–98)
MONOCYTES # BLD AUTO: 0.73 THOUSAND/ÂΜL (ref 0.17–1.22)
MONOCYTES NFR BLD AUTO: 9 % (ref 4–12)
NEUTROPHILS # BLD AUTO: 5.75 THOUSANDS/ÂΜL (ref 1.85–7.62)
NEUTS SEG NFR BLD AUTO: 66 % (ref 43–75)
NRBC BLD AUTO-RTO: 0 /100 WBCS
PLATELET # BLD AUTO: 255 THOUSANDS/UL (ref 149–390)
PMV BLD AUTO: 11.1 FL (ref 8.9–12.7)
RBC # BLD AUTO: 4.07 MILLION/UL (ref 3.81–5.12)
WBC # BLD AUTO: 8.55 THOUSAND/UL (ref 4.31–10.16)

## 2024-08-06 PROCEDURE — 76816 OB US FOLLOW-UP PER FETUS: CPT | Performed by: OBSTETRICS & GYNECOLOGY

## 2024-08-06 PROCEDURE — 36415 COLL VENOUS BLD VENIPUNCTURE: CPT

## 2024-08-06 PROCEDURE — 99213 OFFICE O/P EST LOW 20 MIN: CPT | Performed by: OBSTETRICS & GYNECOLOGY

## 2024-08-06 PROCEDURE — 85025 COMPLETE CBC W/AUTO DIFF WBC: CPT

## 2024-08-07 ENCOUNTER — TELEPHONE (OUTPATIENT)
Age: 29
End: 2024-08-07

## 2024-08-07 NOTE — PROGRESS NOTES
"[facility]: Ms. Garcia was seen today for fetal growth assessment ultrasound.  See ultrasound report under \"OB Procedures\" tab.   The time spent on this established patient on the encounter date included 5 minutes previsit service time reviewing records and precharting, 5 minutes face-to-face service time counseling regarding results and coordinating care, and  4 minutes charting, totalling 14 minutes.  Please don't hesitate to contact our office with any concerns or questions.  -Marie Ackerman MD    "

## 2024-08-07 NOTE — TELEPHONE ENCOUNTER
----- Message from NATALIA Warren sent at 8/6/2024 10:39 PM EDT -----  Slight anemia noted on blood count.   Start an iron supplement every other day with vitamin C 8 hours apart from prenatal vitamin.

## 2024-08-12 ENCOUNTER — ROUTINE PRENATAL (OUTPATIENT)
Dept: OBGYN CLINIC | Facility: MEDICAL CENTER | Age: 29
End: 2024-08-12
Payer: COMMERCIAL

## 2024-08-12 VITALS
HEART RATE: 100 BPM | HEIGHT: 64 IN | DIASTOLIC BLOOD PRESSURE: 78 MMHG | SYSTOLIC BLOOD PRESSURE: 132 MMHG | WEIGHT: 200 LBS | BODY MASS INDEX: 34.15 KG/M2

## 2024-08-12 DIAGNOSIS — O99.210 OTHER OBESITY DUE TO EXCESS CALORIES AFFECTING PREGNANCY, ANTEPARTUM: Primary | ICD-10-CM

## 2024-08-12 DIAGNOSIS — Z3A.33 33 WEEKS GESTATION OF PREGNANCY: ICD-10-CM

## 2024-08-12 DIAGNOSIS — Z34.03 ENCOUNTER FOR SUPERVISION OF NORMAL FIRST PREGNANCY IN THIRD TRIMESTER: ICD-10-CM

## 2024-08-12 DIAGNOSIS — E66.09 OTHER OBESITY DUE TO EXCESS CALORIES AFFECTING PREGNANCY, ANTEPARTUM: Primary | ICD-10-CM

## 2024-08-12 LAB
SL AMB  POCT GLUCOSE, UA: NORMAL
SL AMB POCT URINE PROTEIN: NORMAL

## 2024-08-12 PROCEDURE — PNV: Performed by: CLINICAL NURSE SPECIALIST

## 2024-08-12 PROCEDURE — 81002 URINALYSIS NONAUTO W/O SCOPE: CPT | Performed by: CLINICAL NURSE SPECIALIST

## 2024-08-12 NOTE — ASSESSMENT & PLAN NOTE
, 33w0d  No major complaints today.   Reports good FM    Reminded pt to choose a pediatrician if not already done and return delivery pp wk    Contraceptive options were reviewed including hormonal methods, both combination (pill, patch, vaginal ring) and progesterone-only (pill, Depo Provera, Implanon), intrauterine devices (Mirena, Paragard), barrier methods (condoms, diaphragm), and male and female sterilization.  The mechanism, risks, benefits, and side effects of all methods were discussed.  If planning to breastfeed would avoid estrogen containing products as this may affect milk supply.  Undecided.   Would not use nuvaring- made cramps worse    We again reviewed the S/S PTL and importance of consistent/regular FM. Reviewed process for FKC and encouraged pt to call with any decreases in fetal movement

## 2024-08-12 NOTE — PROGRESS NOTES
"Chelsea is a 29 y.o.  33w0d here for Routine Prenatal Visit (VIKI 24/Blood type O+/Labs UTD/Del consent signed/Breast pump ordered/Peds placed/Tdap 24/Urine /GBS //Denies LOF, VB, or CTX./Pt has +FM/Questions or conerns- )    Prenatal Visit  Subjective:   Denies unusual vaginal discharge, LOF, VB, or ctx. Reports Fetus is active.   Is c/o some lower back pain. Hx of prior to pregnancy.   Taking Low Dose Aspirin as recommended  Objective:  Visit Vitals  /78 (BP Location: Left arm, Patient Position: Sitting, Cuff Size: Standard)   Pulse 100   Ht 5' 4\" (1.626 m)   Wt 90.7 kg (200 lb)   LMP 01/10/2024 (Approximate)   BMI 34.33 kg/m²   OB Status Pregnant   Smoking Status Never   BSA 1.96 m²       Pregravid Weight/BMI: 88.9 kg (196 lb) (BMI 33.63)  Current Weight:     Total Weight Gain: 2.812 kg (6 lb 3.2 oz)     OBGyn Exam   Fetal Heart Rate: 130 , Fundal Height (cm): 32 cm    Assessment & Plan:   1. Other obesity due to excess calories affecting pregnancy, antepartum  Overview:  Normal early glucola  LD ASA  Assessment & Plan:  US  EFW 42% -nml  TWG 1.814 kg (4 lb)    2. 33 weeks gestation of pregnancy  Assessment & Plan:    , 33w0d  No major complaints today.   Reports good FM    Reminded pt to choose a pediatrician if not already done and return delivery pp wk    Contraceptive options were reviewed including hormonal methods, both combination (pill, patch, vaginal ring) and progesterone-only (pill, Depo Provera, Implanon), intrauterine devices (Mirena, Paragard), barrier methods (condoms, diaphragm), and male and female sterilization.  The mechanism, risks, benefits, and side effects of all methods were discussed.  If planning to breastfeed would avoid estrogen containing products as this may affect milk supply.  Undecided.   Would not use nuvaring- made cramps worse    We again reviewed the S/S PTL and importance of consistent/regular FM. Reviewed process for FKC and encouraged pt to call " with any decreases in fetal movement  3. Encounter for supervision of normal first pregnancy in third trimester  -     POCT urine dip      NATALIA Leonardo  8/12/2024

## 2024-08-27 ENCOUNTER — ROUTINE PRENATAL (OUTPATIENT)
Dept: OBGYN CLINIC | Facility: MEDICAL CENTER | Age: 29
End: 2024-08-27
Payer: COMMERCIAL

## 2024-08-27 ENCOUNTER — CLINICAL SUPPORT (OUTPATIENT)
Dept: POSTPARTUM | Facility: CLINIC | Age: 29
End: 2024-08-27

## 2024-08-27 VITALS
SYSTOLIC BLOOD PRESSURE: 114 MMHG | OXYGEN SATURATION: 98 % | BODY MASS INDEX: 35.36 KG/M2 | HEART RATE: 112 BPM | WEIGHT: 206 LBS | DIASTOLIC BLOOD PRESSURE: 80 MMHG

## 2024-08-27 DIAGNOSIS — O99.210 OTHER OBESITY DUE TO EXCESS CALORIES AFFECTING PREGNANCY, ANTEPARTUM: Primary | ICD-10-CM

## 2024-08-27 DIAGNOSIS — O09.899 RUBELLA NON-IMMUNE STATUS, ANTEPARTUM: ICD-10-CM

## 2024-08-27 DIAGNOSIS — D50.9 IRON DEFICIENCY ANEMIA, UNSPECIFIED IRON DEFICIENCY ANEMIA TYPE: ICD-10-CM

## 2024-08-27 DIAGNOSIS — Z3A.35 35 WEEKS GESTATION OF PREGNANCY: ICD-10-CM

## 2024-08-27 DIAGNOSIS — Z34.03 PRENATAL CARE, FIRST PREGNANCY IN THIRD TRIMESTER: ICD-10-CM

## 2024-08-27 DIAGNOSIS — Z34.03 ENCOUNTER FOR SUPERVISION OF NORMAL FIRST PREGNANCY IN THIRD TRIMESTER: ICD-10-CM

## 2024-08-27 DIAGNOSIS — Z28.39 RUBELLA NON-IMMUNE STATUS, ANTEPARTUM: ICD-10-CM

## 2024-08-27 DIAGNOSIS — E66.09 OTHER OBESITY DUE TO EXCESS CALORIES AFFECTING PREGNANCY, ANTEPARTUM: Primary | ICD-10-CM

## 2024-08-27 DIAGNOSIS — Z32.2 ENCOUNTER FOR CHILDBIRTH INSTRUCTION: Primary | ICD-10-CM

## 2024-08-27 LAB
SL AMB  POCT GLUCOSE, UA: NORMAL
SL AMB POCT URINE PROTEIN: NORMAL

## 2024-08-27 PROCEDURE — PNV: Performed by: PHYSICIAN ASSISTANT

## 2024-08-27 PROCEDURE — 81002 URINALYSIS NONAUTO W/O SCOPE: CPT | Performed by: PHYSICIAN ASSISTANT

## 2024-08-27 NOTE — PROGRESS NOTES
Prenatal visit  GA 35w 1d  Denies any vaginal bleeding, Leaking of fluid or pelvic cramping   Normal Fetal movement   28 wk labs completed   S/p Tdap vaccine 07/22/24  Delivery consent signed on 07/12/24  Breast pump previously ordered.   Peds referral previously placed.   Patient reports acid reflux and back pain. She is taking tums for the reflux.

## 2024-08-27 NOTE — PROGRESS NOTES
Problem List Items Addressed This Visit       35 weeks gestation of pregnancy     Chelsea  is a 29 y.o.  @35w1d who presents for routine prenatal visit.      28 wk labs - anemia, resolved on repeat CBC. Normal 1 hr, NR syphilis screen.   Growth scan @ 32 weeks wnl. No further US scheduled   TDAP - received   Discussed recommended timing of RSV vaccine.   Delivery consent - on file  Birth plan returned today and on file.   Plans to breastfeed. Pump - has received  Ped - [facility]   Perineal massage - encouraged      TWG 4.536 kg (10 lb)    Reports good fetal movement.  Denies LOF, vaginal bleeding, regular uterine contractions, cramping, headaches or visual changes.      Reviewed PTL/Labor precautions and FKC.             Relevant Orders    POCT urine dip (Completed)    Obesity affecting pregnancy, antepartum     TWG 10 lbs. Goal 11-20  Normal 1 hr with 28 week labs.  Normal growth scan @ 32 weeks - no further US scheduled  Continues LDASA          Rubella non-immune status, antepartum    Iron deficiency anemia     Hgb 10.3, ferritin 22 with 28 week labs. Repeat H/H / 10.7/33.8. Continues iron supplement          Encounter for supervision of normal first pregnancy in third trimester     Other Visit Diagnoses       Prenatal care, first pregnancy in third trimester    -  Primary    Relevant Orders    POCT urine dip (Completed)

## 2024-08-27 NOTE — ASSESSMENT & PLAN NOTE
TWG 10 lbs. Goal 11-20  Normal 1 hr with 28 week labs.  Normal growth scan @ 32 weeks - no further US scheduled  Continues LDASA

## 2024-08-27 NOTE — ASSESSMENT & PLAN NOTE
Chelsea  is a 29 y.o.  @35w1d who presents for routine prenatal visit.      28 wk labs - anemia, resolved on repeat CBC. Normal 1 hr, NR syphilis screen.   Growth scan @ 32 weeks wnl. No further US scheduled   TDAP - received   Discussed recommended timing of RSV vaccine.   Delivery consent - on file  Birth plan returned today and on file.   Plans to breastfeed. Pump - has received  Ped -  [facility]   Perineal massage - encouraged      TWG 4.536 kg (10 lb)    Reports good fetal movement.  Denies LOF, vaginal bleeding, regular uterine contractions, cramping, headaches or visual changes.      Reviewed PTL/Labor precautions and FKC.

## 2024-09-12 ENCOUNTER — TELEPHONE (OUTPATIENT)
Dept: OBGYN CLINIC | Facility: CLINIC | Age: 29
End: 2024-09-12

## 2024-09-12 ENCOUNTER — ROUTINE PRENATAL (OUTPATIENT)
Dept: OBGYN CLINIC | Facility: MEDICAL CENTER | Age: 29
End: 2024-09-12
Payer: COMMERCIAL

## 2024-09-12 VITALS
WEIGHT: 210 LBS | HEART RATE: 92 BPM | HEIGHT: 64 IN | DIASTOLIC BLOOD PRESSURE: 70 MMHG | BODY MASS INDEX: 35.85 KG/M2 | SYSTOLIC BLOOD PRESSURE: 126 MMHG

## 2024-09-12 DIAGNOSIS — Z3A.37 37 WEEKS GESTATION OF PREGNANCY: ICD-10-CM

## 2024-09-12 DIAGNOSIS — Z34.03 PRENATAL CARE, FIRST PREGNANCY IN THIRD TRIMESTER: ICD-10-CM

## 2024-09-12 DIAGNOSIS — Z28.39 RUBELLA NON-IMMUNE STATUS, ANTEPARTUM: ICD-10-CM

## 2024-09-12 DIAGNOSIS — O99.210 OTHER OBESITY DUE TO EXCESS CALORIES AFFECTING PREGNANCY, ANTEPARTUM: Primary | ICD-10-CM

## 2024-09-12 DIAGNOSIS — E66.09 OTHER OBESITY DUE TO EXCESS CALORIES AFFECTING PREGNANCY, ANTEPARTUM: Primary | ICD-10-CM

## 2024-09-12 DIAGNOSIS — O09.899 RUBELLA NON-IMMUNE STATUS, ANTEPARTUM: ICD-10-CM

## 2024-09-12 LAB
SL AMB  POCT GLUCOSE, UA: NORMAL
SL AMB POCT URINE PROTEIN: NORMAL

## 2024-09-12 PROCEDURE — 87150 DNA/RNA AMPLIFIED PROBE: CPT | Performed by: CLINICAL NURSE SPECIALIST

## 2024-09-12 PROCEDURE — 81002 URINALYSIS NONAUTO W/O SCOPE: CPT | Performed by: CLINICAL NURSE SPECIALIST

## 2024-09-12 PROCEDURE — PNV: Performed by: CLINICAL NURSE SPECIALIST

## 2024-09-12 NOTE — TELEPHONE ENCOUNTER
----- Message from NATALIA Kelley sent at 9/12/2024  9:03 AM EDT -----  Regarding: eIOL request  Procedure to be scheduled (IOL or CS): iol    VIKI: Estimated Date of Delivery: 9/30/24     Indication for delivery: elective    Requested date (s) of delivery: 9/30- any time that week   If requested date is unavailable, is there a date by which the pt must be delivered? 40w6d    Physician preference: n/a    If IOL, anticipated method: needs ripening     If CS, with or without tubal: n/a

## 2024-09-12 NOTE — ASSESSMENT & PLAN NOTE
Appropriate wt gain   US 8/6 Showed nml growth- EFW 42%  Stopped Low Dose Aspirin as recommended last week

## 2024-09-16 LAB — GP B STREP DNA SPEC QL NAA+PROBE: POSITIVE

## 2024-09-18 PROBLEM — Z3A.38 38 WEEKS GESTATION OF PREGNANCY: Status: ACTIVE | Noted: 2024-03-12

## 2024-09-18 PROBLEM — O99.820 GBS (GROUP B STREPTOCOCCUS CARRIER), +RV CULTURE, CURRENTLY PREGNANT: Status: ACTIVE | Noted: 2024-09-18

## 2024-09-19 ENCOUNTER — ROUTINE PRENATAL (OUTPATIENT)
Dept: OBGYN CLINIC | Facility: MEDICAL CENTER | Age: 29
End: 2024-09-19

## 2024-09-19 VITALS
DIASTOLIC BLOOD PRESSURE: 86 MMHG | HEIGHT: 64 IN | OXYGEN SATURATION: 99 % | SYSTOLIC BLOOD PRESSURE: 124 MMHG | HEART RATE: 86 BPM | WEIGHT: 207 LBS | BODY MASS INDEX: 35.34 KG/M2

## 2024-09-19 DIAGNOSIS — Z3A.38 38 WEEKS GESTATION OF PREGNANCY: Primary | ICD-10-CM

## 2024-09-19 DIAGNOSIS — E66.09 OTHER OBESITY DUE TO EXCESS CALORIES AFFECTING PREGNANCY, ANTEPARTUM: ICD-10-CM

## 2024-09-19 DIAGNOSIS — Z34.03 PRENATAL CARE, FIRST PREGNANCY IN THIRD TRIMESTER: ICD-10-CM

## 2024-09-19 DIAGNOSIS — O99.820 GBS (GROUP B STREPTOCOCCUS CARRIER), +RV CULTURE, CURRENTLY PREGNANT: ICD-10-CM

## 2024-09-19 DIAGNOSIS — O99.210 OTHER OBESITY DUE TO EXCESS CALORIES AFFECTING PREGNANCY, ANTEPARTUM: ICD-10-CM

## 2024-09-19 PROCEDURE — PNV: Performed by: CLINICAL NURSE SPECIALIST

## 2024-09-19 NOTE — ASSESSMENT & PLAN NOTE
IOL requested last visit for EDC. Awaiting scheduling. 38w3d Doing well, reports fetus remains active.  Declined cervical exam today.  Reviewed should check next visit to verify ripening still needed for IOL.    I have reviewed the signs and symptoms of labor with the patient, including contractions q4-5 minutes for greater than 1 hour, vaginal bleeding, leaking fluid and decreased fetal movement.  Reviewed process for FKC.I have emphasized the continued importance of paying close attention to the baby's movements.  I have instructed the patient to call the office with any of the above symptoms prior to coming to the hospital.     Signs of pre-eclampsia were also reviewed with the patient: headache, visual changes, sudden increased edema and/or severe right upper quadrant pain.

## 2024-09-19 NOTE — PROGRESS NOTES
Prenatal Visit  Subjective:   Chelsea is a 29 y.o.  38w3d here for Routine Prenatal Visit (Denies LOF, VB, CTX/+FM//Urine:not voided/Labs utd//Delivery consent 7/15/24/Birth plan 24/Breast pump received/Peds referral not eeded/Tdap 24/GBS positive///Started to lose small pieces of mucous plug and has been getting more BH in back and sides.)    Denies unusual vaginal discharge, LOF, VB, or cramping/ctx. Some BH ctx.    Objective:  Vitals:    24 0906   BP: 124/86   Pulse: 86   SpO2: 99%     Pregravid Weight/BMI: 88.9 kg (196 lb) (BMI 33.63)  Current Weight: 93.9 kg (207 lb)   Total Weight Gain: 4.99 kg (11 lb)     OBGyn Exam  Fetal Heart Rate: 135 , Fundal Height (cm): 38 cm    Assessment & Plan:    1. 38 weeks gestation of pregnancy  Assessment & Plan:  IOL requested last visit for EDC. Awaiting scheduling. 38w3d Doing well, reports fetus remains active.  Declined cervical exam today.  Reviewed should check next visit to verify ripening still needed for IOL.    I have reviewed the signs and symptoms of labor with the patient, including contractions q4-5 minutes for greater than 1 hour, vaginal bleeding, leaking fluid and decreased fetal movement.  Reviewed process for FKC.I have emphasized the continued importance of paying close attention to the baby's movements.  I have instructed the patient to call the office with any of the above symptoms prior to coming to the hospital.     Signs of pre-eclampsia were also reviewed with the patient: headache, visual changes, sudden increased edema and/or severe right upper quadrant pain.    2. Prenatal care, first pregnancy in third trimester  3. GBS (group B Streptococcus carrier), +RV culture, currently pregnant  Assessment & Plan:  Reviewed results of GBS + result and need for ppx abx during labor. No PCN allergy  4. Other obesity due to excess calories affecting pregnancy, antepartum  Overview:  Normal early glucola  LD ASA- stopped at 36 wks  US  with  EFW 42%      NATALIA Leonardo  9/19/2024

## 2024-09-27 ENCOUNTER — ROUTINE PRENATAL (OUTPATIENT)
Dept: OBGYN CLINIC | Facility: MEDICAL CENTER | Age: 29
End: 2024-09-27
Payer: COMMERCIAL

## 2024-09-27 VITALS
DIASTOLIC BLOOD PRESSURE: 76 MMHG | SYSTOLIC BLOOD PRESSURE: 132 MMHG | WEIGHT: 209 LBS | HEART RATE: 90 BPM | BODY MASS INDEX: 35.68 KG/M2 | HEIGHT: 64 IN

## 2024-09-27 DIAGNOSIS — E66.09 OTHER OBESITY DUE TO EXCESS CALORIES AFFECTING PREGNANCY, ANTEPARTUM: ICD-10-CM

## 2024-09-27 DIAGNOSIS — O09.899 RUBELLA NON-IMMUNE STATUS, ANTEPARTUM: ICD-10-CM

## 2024-09-27 DIAGNOSIS — Z3A.39 39 WEEKS GESTATION OF PREGNANCY: ICD-10-CM

## 2024-09-27 DIAGNOSIS — O99.820 GBS (GROUP B STREPTOCOCCUS CARRIER), +RV CULTURE, CURRENTLY PREGNANT: Primary | ICD-10-CM

## 2024-09-27 DIAGNOSIS — O99.210 OTHER OBESITY DUE TO EXCESS CALORIES AFFECTING PREGNANCY, ANTEPARTUM: ICD-10-CM

## 2024-09-27 DIAGNOSIS — Z28.39 RUBELLA NON-IMMUNE STATUS, ANTEPARTUM: ICD-10-CM

## 2024-09-27 DIAGNOSIS — Z34.03 PRENATAL CARE, FIRST PREGNANCY IN THIRD TRIMESTER: ICD-10-CM

## 2024-09-27 LAB
SL AMB  POCT GLUCOSE, UA: NORMAL
SL AMB POCT URINE PROTEIN: NORMAL

## 2024-09-27 PROCEDURE — PNV: Performed by: CLINICAL NURSE SPECIALIST

## 2024-09-27 PROCEDURE — 81002 URINALYSIS NONAUTO W/O SCOPE: CPT | Performed by: CLINICAL NURSE SPECIALIST

## 2024-09-27 NOTE — ASSESSMENT & PLAN NOTE
39w4d Doing well, reports fetus remains active.  Cervix still closed.  Has IOL on Monday   Reassured regarding swelling as the way she describes it is very common and normal in pregnancy as long as blood pressure is not elevated and she does not have any symptoms of preeclampsia.    I have reviewed the signs and symptoms of labor with the patient, including contractions q4-5 minutes for greater than 1 hour, vaginal bleeding, leaking fluid and decreased fetal movement.  Reviewed process for FKC.I have emphasized the continued importance of paying close attention to the baby's movements.  I have instructed the patient to call the office with any of the above symptoms prior to coming to the hospital.     Signs of pre-eclampsia were also reviewed with the patient: headache, visual changes, sudden increased edema and/or severe right upper quadrant pain.

## 2024-09-27 NOTE — PROGRESS NOTES
"Prenatal Visit  Subjective:   Chelsea is a 29 y.o.  39w4d here for Routine Prenatal Visit (VIKI 24/Blood type O+/Labs UTD/Del consent signed/Breast pump ordered/Peds placed/Tdap 24/Urine/GBS positive //Denies LOF, VB, or CTX./Pt has +FM/Questions or conerns-/)       Denies unusual vaginal discharge, LOF, VB, or cramping/ctx.  Feeling FM. Reports she feels like baby \"dropped\"    Is noting increased generalized swelling, but denies HA, vision changes or RUQ pain.  Hands more swollen in AM, and feet more late in the day    Objective:  Vitals:    24 0804   BP: 132/76   Pulse: 90     Pregravid Weight/BMI: 88.9 kg (196 lb) (BMI 33.63)  Current Weight: 94.8 kg (209 lb)   Total Weight Gain: 5.897 kg (13 lb)     OBGyn Exam  Fetal Heart Rate: 130 , Fundal Height (cm): 38 cm    Assessment & Plan:  1. GBS (group B Streptococcus carrier), +RV culture, currently pregnant  Assessment & Plan:  No abx allergies- will receive PCN in labor   2. Prenatal care, first pregnancy in third trimester  3. 39 weeks gestation of pregnancy  Assessment & Plan:  39w4d Doing well, reports fetus remains active.  Cervix still closed.  Has IOL on Monday   Reassured regarding swelling as the way she describes it is very common and normal in pregnancy as long as blood pressure is not elevated and she does not have any symptoms of preeclampsia.    I have reviewed the signs and symptoms of labor with the patient, including contractions q4-5 minutes for greater than 1 hour, vaginal bleeding, leaking fluid and decreased fetal movement.  Reviewed process for FKC.I have emphasized the continued importance of paying close attention to the baby's movements.  I have instructed the patient to call the office with any of the above symptoms prior to coming to the hospital.     Signs of pre-eclampsia were also reviewed with the patient: headache, visual changes, sudden increased edema and/or severe right upper quadrant pain.      4. Other obesity " due to excess calories affecting pregnancy, antepartum  Overview:  Normal early glucola  LD ASA- stopped at 36 wks  US 8/6 with EFW 42%  Assessment & Plan:  TWG 5.897 kg (13 lb)  Doing well.     5. Rubella non-immune status, antepartum  Overview:  Plan for MMR postpartum      NATALIA Leonardo  9/27/2024

## 2024-09-30 ENCOUNTER — HOSPITAL ENCOUNTER (INPATIENT)
Facility: HOSPITAL | Age: 29
LOS: 2 days | Discharge: HOME | End: 2024-10-02
Attending: STUDENT IN AN ORGANIZED HEALTH CARE EDUCATION/TRAINING PROGRAM | Admitting: STUDENT IN AN ORGANIZED HEALTH CARE EDUCATION/TRAINING PROGRAM
Payer: COMMERCIAL

## 2024-09-30 ENCOUNTER — TELEPHONE (OUTPATIENT)
Dept: OBGYN CLINIC | Facility: CLINIC | Age: 29
End: 2024-09-30

## 2024-09-30 ENCOUNTER — APPOINTMENT (OUTPATIENT)
Dept: LABOR AND DELIVERY | Facility: HOSPITAL | Age: 29
End: 2024-09-30
Payer: COMMERCIAL

## 2024-09-30 PROBLEM — Z34.90 ENCOUNTER FOR INDUCTION OF LABOR: Status: ACTIVE | Noted: 2024-09-30

## 2024-09-30 LAB
ABO GROUP BLD: NORMAL
BLD GP AB SCN SERPL QL: NEGATIVE
ERYTHROCYTE [DISTWIDTH] IN BLOOD BY AUTOMATED COUNT: 15.2 % (ref 11.6–15.1)
HCT VFR BLD AUTO: 35.2 % (ref 34.8–46.1)
HGB BLD-MCNC: 11.2 G/DL (ref 11.5–15.4)
HOLD SPECIMEN: NORMAL
MCH RBC QN AUTO: 25.7 PG (ref 26.8–34.3)
MCHC RBC AUTO-ENTMCNC: 31.8 G/DL (ref 31.4–37.4)
MCV RBC AUTO: 81 FL (ref 82–98)
PLATELET # BLD AUTO: 252 THOUSANDS/UL (ref 149–390)
PMV BLD AUTO: 11.3 FL (ref 8.9–12.7)
RBC # BLD AUTO: 4.36 MILLION/UL (ref 3.81–5.12)
RH BLD: POSITIVE
SPECIMEN EXPIRATION DATE: NORMAL
WBC # BLD AUTO: 8.87 THOUSAND/UL (ref 4.31–10.16)

## 2024-09-30 PROCEDURE — 86900 BLOOD TYPING SEROLOGIC ABO: CPT

## 2024-09-30 PROCEDURE — 3E0DXGC INTRODUCTION OF OTHER THERAPEUTIC SUBSTANCE INTO MOUTH AND PHARYNX, EXTERNAL APPROACH: ICD-10-PCS | Performed by: OBSTETRICS & GYNECOLOGY

## 2024-09-30 PROCEDURE — 86901 BLOOD TYPING SEROLOGIC RH(D): CPT

## 2024-09-30 PROCEDURE — NC001 PR NO CHARGE: Performed by: STUDENT IN AN ORGANIZED HEALTH CARE EDUCATION/TRAINING PROGRAM

## 2024-09-30 PROCEDURE — 86850 RBC ANTIBODY SCREEN: CPT

## 2024-09-30 PROCEDURE — 86780 TREPONEMA PALLIDUM: CPT

## 2024-09-30 PROCEDURE — 85027 COMPLETE CBC AUTOMATED: CPT

## 2024-09-30 RX ORDER — SODIUM CHLORIDE, SODIUM LACTATE, POTASSIUM CHLORIDE, CALCIUM CHLORIDE 600; 310; 30; 20 MG/100ML; MG/100ML; MG/100ML; MG/100ML
125 INJECTION, SOLUTION INTRAVENOUS CONTINUOUS
Status: DISCONTINUED | OUTPATIENT
Start: 2024-09-30 | End: 2024-10-03 | Stop reason: HOSPADM

## 2024-09-30 RX ORDER — BUTORPHANOL TARTRATE 1 MG/ML
1 INJECTION, SOLUTION INTRAMUSCULAR; INTRAVENOUS
Status: DISCONTINUED | OUTPATIENT
Start: 2024-09-30 | End: 2024-09-30

## 2024-09-30 RX ORDER — BUPIVACAINE HYDROCHLORIDE 2.5 MG/ML
30 INJECTION, SOLUTION EPIDURAL; INFILTRATION; INTRACAUDAL ONCE AS NEEDED
Status: DISCONTINUED | OUTPATIENT
Start: 2024-09-30 | End: 2024-10-01

## 2024-09-30 RX ORDER — BUTORPHANOL TARTRATE 1 MG/ML
1 INJECTION, SOLUTION INTRAMUSCULAR; INTRAVENOUS ONCE
Status: DISCONTINUED | OUTPATIENT
Start: 2024-09-30 | End: 2024-10-01

## 2024-09-30 RX ORDER — ONDANSETRON 2 MG/ML
4 INJECTION INTRAMUSCULAR; INTRAVENOUS EVERY 6 HOURS PRN
Status: DISCONTINUED | OUTPATIENT
Start: 2024-09-30 | End: 2024-10-01

## 2024-09-30 RX ADMIN — BUTORPHANOL TARTRATE 1 MG: 1 INJECTION, SOLUTION INTRAMUSCULAR; INTRAVENOUS at 22:30

## 2024-09-30 RX ADMIN — ONDANSETRON 4 MG: 2 INJECTION INTRAMUSCULAR; INTRAVENOUS at 23:46

## 2024-09-30 RX ADMIN — PENICILLIN G POTASSIUM 5 MILLION UNITS: 20000000 INJECTION, POWDER, FOR SOLUTION INTRAVENOUS at 21:02

## 2024-09-30 RX ADMIN — SODIUM CHLORIDE, SODIUM LACTATE, POTASSIUM CHLORIDE, AND CALCIUM CHLORIDE 125 ML/HR: .6; .31; .03; .02 INJECTION, SOLUTION INTRAVENOUS at 21:01

## 2024-09-30 RX ADMIN — Medication 25 MCG: at 21:23

## 2024-09-30 NOTE — TELEPHONE ENCOUNTER
"Overall how are you feeling? Feeling good.    Compliant with routine OB appointments? yes    Have you completed your 3rd trimester lab work? yes    Have you reviewed the contents of 3rd trimester folder from office?    Have you decided on a pediatrician? [facility]    If yes, who pediatric group    If no, what are you looking for and request sent for outreach.   Questions on paperwork to go back to office? no   Questions on the baby birth certificate forms? No  Pt \"has everything ready for her IOL.\"    [employer] [facility]     Send link for the Hospital Readiness Video via [employer]     "

## 2024-10-01 ENCOUNTER — ANESTHESIA EVENT (INPATIENT)
Dept: ANESTHESIOLOGY | Facility: HOSPITAL | Age: 29
End: 2024-10-01
Payer: COMMERCIAL

## 2024-10-01 ENCOUNTER — ANESTHESIA (INPATIENT)
Dept: ANESTHESIOLOGY | Facility: HOSPITAL | Age: 29
End: 2024-10-01
Payer: COMMERCIAL

## 2024-10-01 PROBLEM — Z34.90 ENCOUNTER FOR INDUCTION OF LABOR: Chronic | Status: ACTIVE | Noted: 2024-09-30

## 2024-10-01 LAB
BASE EXCESS BLDCOA CALC-SCNC: -3.7 MMOL/L (ref 3–11)
BASE EXCESS BLDCOV CALC-SCNC: -3.7 MMOL/L (ref 1–9)
HCO3 BLDCOA-SCNC: 25.1 MMOL/L (ref 17.3–27.3)
HCO3 BLDCOV-SCNC: 23.1 MMOL/L (ref 12.2–28.6)
O2 CT VFR BLDCOA CALC: 5.2 ML/DL
OXYHGB MFR BLDCOA: 23.9 %
OXYHGB MFR BLDCOV: 49.1 %
PCO2 BLDCOA: 61 MM[HG] (ref 30–60)
PCO2 BLDCOV: 48.1 MM HG (ref 27–43)
PH BLDCOA: 7.23 [PH] (ref 7.23–7.43)
PH BLDCOV: 7.3 [PH] (ref 7.19–7.49)
PO2 BLDCOA: 13 MM HG (ref 5–25)
PO2 BLDCOV: 22 MM HG (ref 15–45)
SAO2 % BLDCOV: 10.5 ML/DL
TREPONEMA PALLIDUM IGG+IGM AB [PRESENCE] IN SERUM OR PLASMA BY IMMUNOASSAY: NORMAL

## 2024-10-01 PROCEDURE — 82805 BLOOD GASES W/O2 SATURATION: CPT | Performed by: OBSTETRICS & GYNECOLOGY

## 2024-10-01 PROCEDURE — 4A1HXCZ MONITORING OF PRODUCTS OF CONCEPTION, CARDIAC RATE, EXTERNAL APPROACH: ICD-10-PCS | Performed by: OBSTETRICS & GYNECOLOGY

## 2024-10-01 PROCEDURE — 0UQMXZZ REPAIR VULVA, EXTERNAL APPROACH: ICD-10-PCS | Performed by: OBSTETRICS & GYNECOLOGY

## 2024-10-01 PROCEDURE — 0KQM0ZZ REPAIR PERINEUM MUSCLE, OPEN APPROACH: ICD-10-PCS | Performed by: OBSTETRICS & GYNECOLOGY

## 2024-10-01 PROCEDURE — 59400 OBSTETRICAL CARE: CPT | Performed by: OBSTETRICS & GYNECOLOGY

## 2024-10-01 PROCEDURE — 3E033VJ INTRODUCTION OF OTHER HORMONE INTO PERIPHERAL VEIN, PERCUTANEOUS APPROACH: ICD-10-PCS | Performed by: OBSTETRICS & GYNECOLOGY

## 2024-10-01 RX ORDER — LIDOCAINE HYDROCHLORIDE AND EPINEPHRINE 15; 5 MG/ML; UG/ML
INJECTION, SOLUTION EPIDURAL
Status: COMPLETED | OUTPATIENT
Start: 2024-10-01 | End: 2024-10-01

## 2024-10-01 RX ORDER — OXYTOCIN/RINGER'S LACTATE 30/500 ML
250 PLASTIC BAG, INJECTION (ML) INTRAVENOUS ONCE
Status: DISCONTINUED | OUTPATIENT
Start: 2024-10-01 | End: 2024-10-03 | Stop reason: HOSPADM

## 2024-10-01 RX ORDER — CALCIUM CARBONATE 500 MG/1
1000 TABLET, CHEWABLE ORAL DAILY PRN
Status: DISCONTINUED | OUTPATIENT
Start: 2024-10-01 | End: 2024-10-01

## 2024-10-01 RX ORDER — ONDANSETRON 2 MG/ML
4 INJECTION INTRAMUSCULAR; INTRAVENOUS EVERY 8 HOURS PRN
Status: DISCONTINUED | OUTPATIENT
Start: 2024-10-01 | End: 2024-10-03 | Stop reason: HOSPADM

## 2024-10-01 RX ORDER — ACETAMINOPHEN 325 MG/1
975 TABLET ORAL EVERY 6 HOURS
Status: DISCONTINUED | OUTPATIENT
Start: 2024-10-01 | End: 2024-10-03 | Stop reason: HOSPADM

## 2024-10-01 RX ORDER — OXYTOCIN/RINGER'S LACTATE 30/500 ML
1-30 PLASTIC BAG, INJECTION (ML) INTRAVENOUS
Status: DISCONTINUED | OUTPATIENT
Start: 2024-10-01 | End: 2024-10-03 | Stop reason: HOSPADM

## 2024-10-01 RX ORDER — BENZOCAINE/MENTHOL 6 MG-10 MG
1 LOZENGE MUCOUS MEMBRANE DAILY PRN
Status: DISCONTINUED | OUTPATIENT
Start: 2024-10-01 | End: 2024-10-03 | Stop reason: HOSPADM

## 2024-10-01 RX ORDER — SIMETHICONE 80 MG
80 TABLET,CHEWABLE ORAL 4 TIMES DAILY PRN
Status: DISCONTINUED | OUTPATIENT
Start: 2024-10-01 | End: 2024-10-03 | Stop reason: HOSPADM

## 2024-10-01 RX ORDER — DIPHENHYDRAMINE HYDROCHLORIDE 50 MG/ML
25 INJECTION INTRAMUSCULAR; INTRAVENOUS EVERY 6 HOURS PRN
Status: DISCONTINUED | OUTPATIENT
Start: 2024-10-01 | End: 2024-10-03 | Stop reason: HOSPADM

## 2024-10-01 RX ORDER — IBUPROFEN 600 MG/1
600 TABLET, FILM COATED ORAL EVERY 6 HOURS
Status: DISCONTINUED | OUTPATIENT
Start: 2024-10-01 | End: 2024-10-03 | Stop reason: HOSPADM

## 2024-10-01 RX ORDER — CALCIUM CARBONATE 500 MG/1
1000 TABLET, CHEWABLE ORAL 3 TIMES DAILY PRN
Status: DISCONTINUED | OUTPATIENT
Start: 2024-10-01 | End: 2024-10-03 | Stop reason: HOSPADM

## 2024-10-01 RX ADMIN — LIDOCAINE HYDROCHLORIDE AND EPINEPHRINE 3 ML: 15; 5 INJECTION, SOLUTION EPIDURAL at 06:45

## 2024-10-01 RX ADMIN — SODIUM CHLORIDE, SODIUM LACTATE, POTASSIUM CHLORIDE, AND CALCIUM CHLORIDE 525 ML/HR: .6; .31; .03; .02 INJECTION, SOLUTION INTRAVENOUS at 00:27

## 2024-10-01 RX ADMIN — PENICILLIN G POTASSIUM 2.5 MILLION UNITS: 20000000 INJECTION, POWDER, FOR SOLUTION INTRAVENOUS at 01:03

## 2024-10-01 RX ADMIN — PENICILLIN G POTASSIUM 2.5 MILLION UNITS: 20000000 INJECTION, POWDER, FOR SOLUTION INTRAVENOUS at 12:40

## 2024-10-01 RX ADMIN — ROPIVACAINE HYDROCHLORIDE: 2 INJECTION, SOLUTION EPIDURAL; INFILTRATION at 12:32

## 2024-10-01 RX ADMIN — PENICILLIN G POTASSIUM 2.5 MILLION UNITS: 20000000 INJECTION, POWDER, FOR SOLUTION INTRAVENOUS at 05:07

## 2024-10-01 RX ADMIN — ACETAMINOPHEN 975 MG: 325 TABLET ORAL at 15:53

## 2024-10-01 RX ADMIN — CALCIUM CARBONATE 1000 MG: 500 TABLET, CHEWABLE ORAL at 03:34

## 2024-10-01 RX ADMIN — ACETAMINOPHEN 975 MG: 325 TABLET ORAL at 23:49

## 2024-10-01 RX ADMIN — IBUPROFEN 600 MG: 600 TABLET, FILM COATED ORAL at 15:53

## 2024-10-01 RX ADMIN — OXYTOCIN 2 MILLI-UNITS/MIN: 10 INJECTION INTRAVENOUS at 03:45

## 2024-10-01 RX ADMIN — PENICILLIN G POTASSIUM 2.5 MILLION UNITS: 20000000 INJECTION, POWDER, FOR SOLUTION INTRAVENOUS at 09:26

## 2024-10-01 RX ADMIN — SODIUM CHLORIDE, SODIUM LACTATE, POTASSIUM CHLORIDE, AND CALCIUM CHLORIDE 125 ML/HR: .6; .31; .03; .02 INJECTION, SOLUTION INTRAVENOUS at 09:28

## 2024-10-01 RX ADMIN — IBUPROFEN 600 MG: 600 TABLET, FILM COATED ORAL at 22:16

## 2024-10-01 NOTE — H&P
H & P- Obstetrics   Chelsea Garcia 29 y.o. female MRN: [id number]  Unit/Bed#: -01 Encounter: [id number]    Assessment: 29 y.o.  at 40w0d admitted for IOL    Plan:   Rubella non-immune status, antepartum  Assessment & Plan  Offer MMR pp    * Encounter for induction of labor  Assessment & Plan  Admit to OBGYN   Clear liquid diet   F/u T&S, CBC, RPR   IVF LR 125cc/hr   Continuous fetal monitoring and tocometry   Analgesia at maternal request   Vertex by TAUS  Induction plan FB/Cyto          Discussed case and plan w/ Dr. Day Garcia      Chief Complaint: none, IOL    HPI: Chelsea Garcia is a 29 y.o.  with an VIKI of 2024, by Ultrasound at 40w0d who is being admitted for IOL. She denies having uterine contractions, has no LOF, and reports no VB. She states she has felt good FM.    Patient Active Problem List   Diagnosis    Chronic pain of left knee    39 weeks gestation of pregnancy    Obesity affecting pregnancy, antepartum    Rubella non-immune status, antepartum    Iron deficiency anemia    Encounter for supervision of normal first pregnancy in third trimester    GBS (group B Streptococcus carrier), +RV culture, currently pregnant    Encounter for induction of labor       Baby complications/comments: none    Review of Systems   Constitutional:  Negative for chills and fever.   HENT:  Negative for congestion and sore throat.    Eyes:  Negative for visual disturbance.   Respiratory:  Negative for cough and shortness of breath.    Cardiovascular:  Negative for chest pain and palpitations.   Gastrointestinal:  Negative for abdominal pain and vomiting.   Genitourinary:  Negative for dysuria and hematuria.   Musculoskeletal:  Negative for arthralgias and back pain.   Skin:  Negative for color change and rash.   Neurological:  Negative for syncope.   All other systems reviewed and are negative.      OB Hx:  OB History    Para Term  AB Living   1             SAB IAB Ectopic Multiple  Live Births                  # Outcome Date GA Lbr Parviz/2nd Weight Sex Type Anes PTL Lv   1 Current                Past Medical Hx:  Past Medical History:   Diagnosis Date    Migraine     hx of migraines    Urinary tract infection        Past Surgical hx:  History reviewed. No pertinent surgical history.    Social Hx:  Alcohol use: denies  Tobacco use: denies  Other substance use: denies     Allergies   Allergen Reactions    Latex Rash    Plum Pulp - Food Allergy Rash         Medications Prior to Admission:     acetaminophen (TYLENOL) 325 mg tablet    Calcium Carbonate Antacid (TUMS PO)    Ferrous Sulfate (Iron) 28 MG TABS    NON FORMULARY    Prenatal Vit-Fe Sulfate-FA (PRENATAL MULTIVIT-IRON PO)    Objective:     There is no height or weight on file to calculate BMI.     Physical Exam:  Physical Exam  Constitutional:       General: She is not in acute distress.  Genitourinary:      Vulva and rectum normal.   HENT:      Head: Normocephalic and atraumatic.      Nose: Nose normal.      Mouth/Throat:      Mouth: Mucous membranes are moist.   Eyes:      Extraocular Movements: Extraocular movements intact.      Pupils: Pupils are equal, round, and reactive to light.   Cardiovascular:      Rate and Rhythm: Normal rate.      Pulses: Normal pulses.   Pulmonary:      Effort: Pulmonary effort is normal. No respiratory distress.   Abdominal:      Palpations: Abdomen is soft.      Tenderness: There is no guarding or rebound.      Comments: Normal appearing gravid abdomen   Musculoskeletal:         General: No deformity.      Cervical back: Normal range of motion and neck supple.   Neurological:      General: No focal deficit present.      Mental Status: She is alert and oriented to person, place, and time.   Skin:     General: Skin is warm and dry.        SVE: 1/50/-3    FHT:   120s, mod variability, + accels, no decels    TOCO:     irregular    Vertex: Cephalad    Lab Results   Component Value Date    WBC 8.87 09/30/2024    HGB  11.2 (L) 09/30/2024    HCT 35.2 09/30/2024     09/30/2024     Lab Results   Component Value Date    K 4.1 03/29/2024     03/29/2024    CO2 26 03/29/2024    BUN 8 03/29/2024    CREATININE 0.60 03/29/2024    AST 15 03/29/2024    ALT 10 03/29/2024       Prenatal Labs: Reviewed      Blood type: O+  Antibody: N/A  GBS: positive  HIV: Non-reactive  Rubella: non-immune  Syphilis IgM/IgG: Non-reactive  HBsAg: Non-reactive  HCAb: Non-reactive  Chlamydia: Negative  Gonorrhea: Negative  Diabetes 1 hour screen: 93  3 hour glucose: 83  Platelets: 255  Hgb: 10.7    >2 Midnights  INPATIENT     Signature/Title:  Sujatha English MD  Date: 9/30/2024  Time: 9:35 PM

## 2024-10-01 NOTE — OB LABOR/OXYTOCIN SAFETY PROGRESS
Oxytocin Safety Progress Check Note - Chelsea Garcia 29 y.o. female MRN: [id number]    Unit/Bed#: -01 Encounter: [id number]    Dose (lauren-units/min) Oxytocin: 4 lauren-units/min  Contraction Frequency (minutes): 1.5-5  Contraction Intensity: Mild/Moderate  Uterine Activity Characteristics: Coupling  Cervical Dilation: 3        Cervical Effacement: 70  Fetal Station: -2  Baseline Rate (FHR): 135 bpm  Fetal Heart Rate (FHT): 118 BPM  FHR Category: I             Vital Signs:   Vitals:    10/01/24 0730   BP: 113/60   Pulse:    Resp:    Temp:    SpO2:      Notes/comments:   Pt resting comfortably. FHR Category I. [place] with contractions q4min. SVE as above. Pit running at 4, continue to titrate to contractions. Will recheck in 2 hours or sooner if clinically indicated. Anticipate . Attending physician Dr. Tessy abad.     Luis Tolentino MD 10/1/2024 7:47 AM

## 2024-10-01 NOTE — OB LABOR/OXYTOCIN SAFETY PROGRESS
Oxytocin Safety Progress Check Note - Chelsea Garcia 29 y.o. female MRN: [id number]    Unit/Bed#: -01 Encounter: [id number]    Dose (lauren-units/min) Oxytocin: 12 lauren-units/min  Contraction Frequency (minutes): 2-4  Contraction Intensity: Mild  Uterine Activity Characteristics: Coupling  Cervical Dilation: 10  Dilation Complete Date: 10/01/24  Dilation Complete Time: 1259  Cervical Effacement: 100  Fetal Station: 2  Baseline Rate (FHR): 135 bpm  Fetal Heart Rate (FHT): 118 BPM  FHR Category: 2               Vital Signs:   Vitals:    10/01/24 1234   BP: 122/59   Pulse: 82   Resp:    Temp:    SpO2:        Notes/comments:   At bedside due to minimal variability and difficulty monitoring FHT/contractions with PGY1 Dr. Tolentino. Plan to place FSE/IUPC however on repeat SVE 10/100/+2 and direct OA fetal presentation. Will plan to begin pushing. Dr. Still [facility] Deena Gonsalez MD 10/1/2024 1:00 PM

## 2024-10-01 NOTE — DISCHARGE INSTR - AVS FIRST PAGE
Discharge instructions:   -Do not place anything (no partner, sex toys, tampons, douche, etc.) in your vagina for 6 weeks  -You may walk for exercise for the first 6 weeks then gradually return to your usual activities   -Please do not drive for 1 week if you have no stitches and for 2 weeks if you have stitches    -Please do not drive if you are taking any narcotic medications  -You may take baths or shower per your preference   -Please examine your breasts in the mirror daily and call your doctor for redness, tenderness, increased warmth, fevers, or chills  -Please call your doctor's office for temperature > 100.4*F or 38*C, worsening pain, increased bleeding (filling 2 or more maxi pads within 1 hr or less for at least 2 hrs), foul-smelling discharge/drainage, burning with urination, or symptoms of depression

## 2024-10-01 NOTE — ANESTHESIA PREPROCEDURE EVALUATION
Procedure:  LABOR ANALGESIA    Relevant Problems   GYN   (+) 39 weeks gestation of pregnancy   (+) Encounter for supervision of normal first pregnancy in third trimester      HEMATOLOGY   (+) Iron deficiency anemia        Physical Exam    Airway    Mallampati score: II         Dental   No notable dental hx     Cardiovascular      Pulmonary      Other Findings  post-pubertal.      Anesthesia Plan  ASA Score- 2     Anesthesia Type- epidural with ASA Monitors.         Additional Monitors:     Airway Plan:     Comment: I, Dr. Garcia, the attending physician, have personally seen and evaluated the patient prior to anesthetic care.  I have reviewed the pre-anesthetic record, and other medical records if appropriate to the anesthetic care.  If a CRNA is involved in the case, I have reviewed the CRNA assessment, if present, and agree.  The patient is in a suitable condition to proceed with my formulated anesthetic plan.  .       Plan Factors-    Chart reviewed.                      Induction-     Postoperative Plan-     Perioperative Resuscitation Plan - Level 1 - Full Code.       Informed Consent- Anesthetic plan and risks discussed with patient.

## 2024-10-01 NOTE — OB LABOR/OXYTOCIN SAFETY PROGRESS
Labor Progress Note - Chelsea Garcia 29 y.o. female MRN: [id number]    Unit/Bed#: -01 Encounter: [id number]       Contraction Frequency (minutes): 1.5-3  Contraction Intensity: Mild/Moderate  Uterine Activity Characteristics: Occasional  Cervical Dilation: 1        Cervical Effacement: 50  Fetal Station: -3  Baseline Rate (FHR): 115 bpm  Fetal Heart Rate (FHT): 119 BPM  FHR Category: 1               Vital Signs:   Vitals:    10/01/24 0055   BP: 136/75   Pulse: 86   Resp:    Temp:    SpO2:        Notes/comments:   Removed FB due to patient discomfort, pt reports feeling better now with derik English MD 10/1/2024 1:22 AM

## 2024-10-01 NOTE — PLAN OF CARE
Problem: BIRTH - VAGINAL/ SECTION  Goal: Fetal and maternal status remain reassuring during the birth process  Description: INTERVENTIONS:  - Monitor vital signs  - Monitor fetal heart rate  - Monitor uterine activity  - Monitor labor progression (vaginal delivery)  - DVT prophylaxis  - Antibiotic prophylaxis  Outcome: Progressing  Goal: Emotionally satisfying birthing experience for mother/fetus  Description: Interventions:  - Assess, plan, implement and evaluate the nursing care given to the patient in labor  - Advocate the philosophy that each childbirth experience is a unique experience and support the family's chosen level of involvement and control during the labor process   - Actively participate in both the patient's and family's teaching of the birth process  - Consider cultural, [place] and age-specific factors and plan care for the patient in labor  Outcome: Progressing     Problem: PAIN - ADULT  Goal: Verbalizes/displays adequate comfort level or baseline comfort level  Description: Interventions:  - Encourage patient to monitor pain and request assistance  - Assess pain using appropriate pain scale  - Administer analgesics based on type and severity of pain and evaluate response  - Implement non-pharmacological measures as appropriate and evaluate response  - Consider cultural and social influences on pain and pain management  - Notify physician/advanced practitioner if interventions unsuccessful or patient reports new pain  Outcome: Progressing     Problem: INFECTION - ADULT  Goal: Absence or prevention of progression during hospitalization  Description: INTERVENTIONS:  - Assess and monitor for signs and symptoms of infection  - Monitor lab/diagnostic results  - Monitor all insertion sites, i.e. indwelling lines, tubes, and drains  - Monitor endotracheal if appropriate and nasal secretions for changes in amount and color  - [place] appropriate cooling/warming therapies per order  -  Administer medications as ordered  - Instruct and encourage patient and family to use good hand hygiene technique  - Identify and instruct in appropriate isolation precautions for identified infection/condition  Outcome: Progressing  Goal: Absence of fever/infection during neutropenic period  Description: INTERVENTIONS:  - Monitor WBC    Outcome: Progressing     Problem: SAFETY ADULT  Goal: Patient will remain free of falls  Description: INTERVENTIONS:  - Educate patient/family on patient safety including physical limitations  - Instruct patient to call for assistance with activity   - Consult OT/PT to assist with strengthening/mobility   - Keep Call bell within reach  - Keep bed low and locked with side rails adjusted as appropriate  - Keep care items and personal belongings within reach  - Initiate and maintain comfort rounds  - Make Fall Risk Sign visible to staff  - Apply yellow socks and bracelet for high fall risk patients  - Consider moving patient to room near nurses station  Outcome: Progressing  Goal: Maintain or return to baseline ADL function  Description: INTERVENTIONS:  -  Assess patient's ability to carry out ADLs; assess patient's baseline for ADL function and identify physical deficits which impact ability to perform ADLs (bathing, care of mouth/teeth, toileting, grooming, dressing, etc.)  - Assess/evaluate cause of self-care deficits   - Assess range of motion  - Assess patient's mobility; develop plan if impaired  - Assess patient's need for assistive devices and provide as appropriate  - Encourage maximum independence but intervene and supervise when necessary  - Involve family in performance of ADLs  - Assess for home care needs following discharge   - Consider OT consult to assist with ADL evaluation and planning for discharge  - Provide patient education as appropriate  Outcome: Progressing  Goal: Maintains/Returns to pre admission functional level  Description: INTERVENTIONS:  - Perform  AM-PAC 6 Click Basic Mobility/ Daily Activity assessment daily.  - Set and communicate daily mobility goal to care team and patient/family/caregiver.   - Collaborate with rehabilitation services on mobility goals if consulted  - Out of bed for toileting  - Record patient progress and toleration of activity level   Outcome: Progressing     Problem: Knowledge Deficit  Goal: Patient/family/caregiver demonstrates understanding of disease process, treatment plan, medications, and discharge instructions  Description: Complete learning assessment and assess knowledge base.  Interventions:  - Provide teaching at level of understanding  - Provide teaching via preferred learning methods  Outcome: Progressing     Problem: DISCHARGE PLANNING  Goal: Discharge to home or other facility with appropriate resources  Description: INTERVENTIONS:  - Identify barriers to discharge w/patient and caregiver  - Arrange for needed discharge resources and transportation as appropriate  - Identify discharge learning needs (meds, wound care, etc.)  - Arrange for interpretive services to assist at discharge as needed  - Refer to Case Management Department for coordinating discharge planning if the patient needs post-hospital services based on physician/advanced practitioner order or complex needs related to functional status, cognitive ability, or social support system  Outcome: Progressing

## 2024-10-01 NOTE — ASSESSMENT & PLAN NOTE
Patient progressing toward postpartum milestones.  - Continue routine postpartum care  - Pain management with oral analgesics  - Encourage breastfeeding, familial bonding

## 2024-10-01 NOTE — L&D DELIVERY NOTE
Vaginal Delivery Summary - OB/GYN   Chelsea Garcia 29 y.o. female MRN: [id number]  Unit/Bed#:  201-01 Encounter: [id number]      Pre-delivery Diagnosis:   Pregnancy at 40w0d  GBS positive status  Rubella nonimmune status    Post-delivery Diagnosis:   Same, delivered  2nd degree perineal laceration  Right labial laceration    Procedure: Spontaneous Vaginal Delivery, 2nd degree perineal laceration repair, right labial laceration repair    Attending: Dr. Still    Assistant(s): Dr. Tolentino    Anesthesia: Epidural    QBL: 30 mL    Complications: none apparent    Specimens:   1. Arterial and venous cord gases  2. Cord blood  3. Segment of umbilical cord  4. Placenta to storage     Findings:  1. Viable female on 10/1/2024 at 1312, with APGARS of 9 and 9 at 1 and 5 minutes respectively  2. Spontaneous delivery of intact placenta at 1316  3. 2 degree laceration repaired with 3-0 Vicryl  4. Right labial laceration repaired with 4-0 Vicryl  5. Blood gases:  Recent Results (from the past 7 hour(s))   CORD, Blood gas, arterial    Collection Time: 10/01/24  1:21 PM   Result Value Ref Range    pH, Cord Art 7.232 7.230 - 7.430    pCO2, Cord Art 61.0 (H) 30.0 - 60.0    pO2, Cord Art 13.0 5.0 - 25.0 mm HG    HCO3, Cord Art 25.1 17.3 - 27.3 mmol/L    Base Exc, Cord Art -3.7 (L) 3.0 - 11.0 mmol/L    O2 Content, Cord Art 5.2 ml/dl    O2 Hgb, Arterial Cord 23.9 %   CORD, Blood gas, venous    Collection Time: 10/01/24  1:21 PM   Result Value Ref Range    pH, Cord Akira 7.299 7.190 - 7.490    pCO2, Cord Akira 48.1 (H) 27.0 - 43.0 mm HG    pO2, Cord Akira 22.0 15.0 - 45.0 mm HG    HCO3, Cord Akira 23.1 12.2 - 28.6 mmol/L    Base Exc, Cord [place] -3.7 (L) 1.0 - 9.0 mmol/L    O2 Cont, Cord [place]ira 10.5 mL/dL    O2 HGB,VENOUS CORD 49.1 %     Disposition:  Patient tolerated the procedure well and was recovering in labor and delivery room     Brief history and labor course:  Ms. Chelsea Garcia presented as a 29 y.o.  at 40w0d with  history significant for GBS positive status, rubella nonimmune status. Her pregnancy was otherwise uncomplicated. She presented to labor and delivery for elective induction of labor. On initial exam she was noted to be 1/50/-3. She was induced with Blanco balloon and vaginal cytotec. The Blanco balloon was removed shortly thereafter due to patient discomfort. Pitocin titration was initiated. She progressed to complete and began to push.    Description of procedure:  After pushing for 9 minutes, at 1312 patient delivered a viable female , wt pending, apgars of 9 (1 min) and 9 (5 min). The fetal vertex delivered spontaneously in the right occiput anterior position. There was no nuchal cord. The left anterior shoulder delivered atraumatically with maternal expulsive forces and the assistance of gentle downward traction. The right posterior shoulder delivered with maternal expulsive forces and the assistance of gentle upward traction. The remainder of the fetus delivered spontaneously.     Upon delivery, the infant was placed on the mother's abdomen and the cord was clamped and cut after delayed cord clamping. The infant was noted to cry spontaneously and was moving all extremities appropriately. There was no evidence of injury. Awaiting nurse resuscitators evaluated the . Arterial and venous cord blood gases and cord blood was collected for analysis. These were promptly sent to the lab. In the immediate post-partum, 30 units of IV pitocin was administered, and the uterus was noted to contract down well with massage and pitocin. The placenta delivered spontaneously at 1316 and was noted to have an eccentrically inserted 3 vessel cord.     The vagina, cervix, perineum, and rectum were inspected and there was noted to be a 2nd degree perineal laceration as well as a right labial laceration.    Laceration Repair  Patient was comfortable with epidural at this time. The apex of the vaginal laceration was  identified and an anchoring suture was placed 1 cm above the apex using 3-0 Vicryl. The vaginal mucosa and underlying rectovaginal fascia were closed in a running locked fashion to the level of the hymenal ring. The suture was then brought underneath the hymenal ring. A stitch was then placed through the bulbocavernosus muscle. Continuing with the same suture, the transverse perineal muscles were re-approximated. The suture was brought to the posterior apex of the skin laceration and the skin was then reapproximated in a subcuticular fashion to the hymenal ring. The right labial laceration was repaired in the standard fashion in order to re-approximate the tissue. Good hemostasis was confirmed at the conclusion of this procedure.     Bimanual exam revealed minimal clots and good uterine tone. The fundus was firm and at the level of the umbilicus. All needle, sponge, and instrument counts were noted to be correct. Patient tolerated the delivery well and was allowed to recover in the labor and delivery room with family and  before being transferred to the post-partum floor. Dr. Still was present and participated in all key portions of the case.    Luis Tolentino MD  OBGYN PGY-1  10/01/24  5:53 PM

## 2024-10-01 NOTE — OB LABOR/OXYTOCIN SAFETY PROGRESS
Labor Progress Note - Elliottjose Garcia 29 y.o. female MRN: [id number]    Unit/Bed#: -01 Encounter: [id number]       Contraction Frequency (minutes): 3-6  Contraction Intensity: Mild/Moderate  Uterine Activity Characteristics: Occasional  Cervical Dilation: 1        Cervical Effacement: 70  Fetal Station: -2  Baseline Rate (FHR): 115 bpm  Fetal Heart Rate (FHT): 117 BPM  FHR Category: 1               Vital Signs:   Vitals:    10/01/24 0254   BP: 106/66   Pulse: 75   Resp:    Temp:    SpO2:        Notes/comments:   Cervix soft and 1-2. Patient did not tolerate ortiz. Will start pitocin titration. Did briefly review with the patient possibility of repeat ortiz pending her cervical change on pitocin.       Rosa Garcia MD 10/1/2024 3:23 AM

## 2024-10-01 NOTE — OB LABOR/OXYTOCIN SAFETY PROGRESS
Labor Progress Note - Chelsea Garcia 29 y.o. female MRN: [id number]    Unit/Bed#: -01 Encounter: [id number]                Cervical Dilation: 1        Cervical Effacement: 50  Fetal Station: -3        FHR Category: 1               Vital Signs:   There were no vitals filed for this visit.    Notes/comments:   Cervical balloon counseling  Discussed risks (AROM, discomfort with placement, possibly of suboptimal dilation if not placed adequately, risk of infection if in place with ROM), benefits (dilation of cervix without medications), alternatives (IV Pitocin or further Cytotec). Discussed that ortiz would be removed after 12 hours or with ROM unless spontaneously expelled prior. She is agreeable to Rotiz placement and gave verbal consent prior to placement.    Cervical Balloon Insertion  A 24F ortiz with a 30cc balloon was selected, a speculum examination was performed and the cervix was located. A ortiz balloon was introduced over sterile gloved hands. Balloon advanced through cervix with a ringed forceps beyond the internal cervical os. A small amount amount of sterile saline solution was instilled in the balloon to confirm placement. Placement was confirmed to be beyond the internal cervical os. A total of 60cc of sterile saline solution was placed into the balloon. Pt tolerated well. Instructions left with RN to place ortiz to gravity with a 1L bag of IV fluid. Notify MD/DO when ortiz dislodged.      Sujatha English MD 9/30/2024 9:33 PM

## 2024-10-01 NOTE — PLAN OF CARE
Problem: BIRTH - VAGINAL/ SECTION  Goal: Fetal and maternal status remain reassuring during the birth process  Description: INTERVENTIONS:  - Monitor vital signs  - Monitor fetal heart rate  - Monitor uterine activity  - Monitor labor progression (vaginal delivery)  - DVT prophylaxis  - Antibiotic prophylaxis  Outcome: Progressing  Goal: Emotionally satisfying birthing experience for mother/fetus  Description: Interventions:  - Assess, plan, implement and evaluate the nursing care given to the patient in labor  - Advocate the philosophy that each childbirth experience is a unique experience and support the family's chosen level of involvement and control during the labor process   - Actively participate in both the patient's and family's teaching of the birth process  - Consider cultural, [place]ianism and age-specific factors and plan care for the patient in labor  Outcome: Progressing     Problem: PAIN - ADULT  Goal: Verbalizes/displays adequate comfort level or baseline comfort level  Description: Interventions:  - Encourage patient to monitor pain and request assistance  - Assess pain using appropriate pain scale  - Administer analgesics based on type and severity of pain and evaluate response  - Implement non-pharmacological measures as appropriate and evaluate response  - Consider cultural and social influences on pain and pain management  - Notify physician/advanced practitioner if interventions unsuccessful or patient reports new pain  Outcome: Progressing     Problem: INFECTION - ADULT  Goal: Absence or prevention of progression during hospitalization  Description: INTERVENTIONS:  - Assess and monitor for signs and symptoms of infection  - Monitor lab/diagnostic results  - Monitor all insertion sites, i.e. indwelling lines, tubes, and drains  - Monitor endotracheal if appropriate and nasal secretions for changes in amount and color  - [place] appropriate cooling/warming therapies per order  -  Administer medications as ordered  - Instruct and encourage patient and family to use good hand hygiene technique  - Identify and instruct in appropriate isolation precautions for identified infection/condition  Outcome: Progressing  Goal: Absence of fever/infection during neutropenic period  Description: INTERVENTIONS:  - Monitor WBC    Outcome: Progressing     Problem: SAFETY ADULT  Goal: Patient will remain free of falls  Description: INTERVENTIONS:  - Educate patient/family on patient safety including physical limitations  - Instruct patient to call for assistance with activity   - Consult OT/PT to assist with strengthening/mobility   - Keep Call bell within reach  - Keep bed low and locked with side rails adjusted as appropriate  - Keep care items and personal belongings within reach  - Initiate and maintain comfort rounds  - Make Fall Risk Sign visible to staff  - Apply yellow socks and bracelet for high fall risk patients  - Consider moving patient to room near nurses station  Outcome: Progressing  Goal: Maintain or return to baseline ADL function  Description: INTERVENTIONS:  -  Assess patient's ability to carry out ADLs; assess patient's baseline for ADL function and identify physical deficits which impact ability to perform ADLs (bathing, care of mouth/teeth, toileting, grooming, dressing, etc.)  - Assess/evaluate cause of self-care deficits   - Assess range of motion  - Assess patient's mobility; develop plan if impaired  - Assess patient's need for assistive devices and provide as appropriate  - Encourage maximum independence but intervene and supervise when necessary  - Involve family in performance of ADLs  - Assess for home care needs following discharge   - Consider OT consult to assist with ADL evaluation and planning for discharge  - Provide patient education as appropriate  Outcome: Progressing  Goal: Maintains/Returns to pre admission functional level  Description: INTERVENTIONS:  - Perform  AM-PAC 6 Click Basic Mobility/ Daily Activity assessment daily.  - Set and communicate daily mobility goal to care team and patient/family/caregiver.   - Collaborate with rehabilitation services on mobility goals if consulted  - Out of bed for toileting  - Record patient progress and toleration of activity level   Outcome: Progressing     Problem: Knowledge Deficit  Goal: Patient/family/caregiver demonstrates understanding of disease process, treatment plan, medications, and discharge instructions  Description: Complete learning assessment and assess knowledge base.  Interventions:  - Provide teaching at level of understanding  - Provide teaching via preferred learning methods  Outcome: Progressing     Problem: DISCHARGE PLANNING  Goal: Discharge to home or other facility with appropriate resources  Description: INTERVENTIONS:  - Identify barriers to discharge w/patient and caregiver  - Arrange for needed discharge resources and transportation as appropriate  - Identify discharge learning needs (meds, wound care, etc.)  - Arrange for interpretive services to assist at discharge as needed  - Refer to Case Management Department for coordinating discharge planning if the patient needs post-hospital services based on physician/advanced practitioner order or complex needs related to functional status, cognitive ability, or social support system  Outcome: Progressing

## 2024-10-01 NOTE — OB LABOR/OXYTOCIN SAFETY PROGRESS
Oxytocin Safety Progress Check Note - Chelsea Garcia 29 y.o. female MRN: [id number]    Unit/Bed#: -01 Encounter: [id number]    Dose (lauren-units/min) Oxytocin: 10 lauren-units/min  Contraction Frequency (minutes): 2-3  Contraction Intensity: Mild  Uterine Activity Characteristics:  (DAVI)  Cervical Dilation: 3        Cervical Effacement: 80  Fetal Station: -2  Baseline Rate (FHR): 120 bpm  Fetal Heart Rate (FHT): 118 BPM  FHR Category: 1               Vital Signs:   Vitals:    10/01/24 0934   BP: 116/68   Pulse: 93   Resp:    Temp:    SpO2:        Notes/comments:   Continue laboring      Lou Castellanos MD 10/1/2024 10:03 AM

## 2024-10-01 NOTE — ANESTHESIA PROCEDURE NOTES
Epidural Block    Patient location during procedure: OB/L&D  Start time: 10/1/2024 6:44 AM  Reason for block: procedure for pain  Staffing  Performed by: Nguyen Fitch CRNA  Authorized by: Phuc Garcia MD    Preanesthetic Checklist  Completed: patient identified, IV checked, site marked, risks and benefits discussed, surgical consent, monitors and equipment checked, pre-op evaluation and timeout performed  Epidural  Patient position: sitting  Prep: ChloraPrep  Sedation Level: no sedation  Patient monitoring: frequent blood pressure checks, continuous pulse oximetry and heart rate  Approach: midline  Location: lumbar, L3-4  Injection technique: DARVIN saline  Needle  Needle type: Tuohy   Needle gauge: 17 G  Needle insertion depth: 6 cm  Catheter type: multi-orifice  Catheter size: 19 G  Catheter at skin depth: 12 cm  Catheter securement method: stabilization device and clear occlusive dressing  Test dose: negativelidocaine-epinephrine (XYLOCAINE-MPF/EPINEPHRINE) 1.5 %-1:200,000 injection 3 mL - Epidural   3 mL - 10/1/2024 6:45:00 AM  Assessment  Sensory level: T10  Number of attempts: 1negative aspiration for CSF, negative aspiration for heme and no paresthesia on injection  patient tolerated the procedure well with no immediate complications

## 2024-10-01 NOTE — DISCHARGE SUMMARY
Discharge Summary - OB/GYN  Chelsea Garcia 29 y.o. female MRN: [id number]  Unit/Bed#: -01 Encounter: [id number]    Admission Date: 2024     Discharge Date: 10/2/24    Admitting Attending: Natalie Still DO    Delivering Attending: Dr. Still    Discharging Attending: Dr. Schneider    Principal Diagnosis: Pregnancy at 40w1d    Secondary Diagnosis, Admission:  GBS positive status  Rubella nonimmune status    Discharge Diagnosis:  Same, delivered  2nd degree perineal laceration  Right labial laceration  Gestational hypertension    Procedures: spontaneous vaginal delivery, 2nd degree laceration repair, right labial laceration repair    Anesthesia: epidural    Hospital course:  Ms. Chelsea Garcia presented as a 29 y.o.  at 40w0d with history significant for GBS positive status, rubella nonimmune status. Her pregnancy was otherwise uncomplicated. She presented to labor and delivery for elective induction of labor. On initial exam she was noted to be 1/50/-3. She was induced with Blanco balloon and vaginal cytotec. The Blanco balloon was removed shortly thereafter due to patient discomfort. Pitocin titration was initiated. She progressed to complete and began to push.     After pushing for 9 minutes, at 1312 patient delivered a viable female , wt pending, apgars of 9 (1 min) and 9 (5 min). The fetal vertex delivered spontaneously in the right occiput anterior position. There was no nuchal cord. The left anterior shoulder delivered atraumatically with maternal expulsive forces and the assistance of gentle downward traction. The right posterior shoulder delivered with maternal expulsive forces and the assistance of gentle upward traction. The remainder of the fetus delivered spontaneously.     The patient sustained a 2nd degree perineal laceration and right labial laceration during delivery which were adequately repaired. She tolerated the procedure well.  was transferred to   nursery.     Her post-delivery course was complicated by diagnosis of gestational hypertension. Mom's blood type is O positive, so RhoGAM was not indicated.    Her postpartum pain was well controlled with oral analgesics. On day of discharge, she was ambulating and able to reasonably perform all ADLs. She was voiding and had appropriate bowel function. She was discharged home on postpartum day #1 without complications. She was instructed to follow up with her OB as an outpatient and was given appropriate warnings to call provider if she develops signs of infection or uncontrolled pain.    Complications: none apparent    Condition at discharge: good     Discharge medications and instructions:   Provisions for Follow-Up Care:  See after visit summary    Disposition: Home    Planned Readmission: No    Luis Tolentino MD  OBGYN PGY-1  10/02/24  6:10 AM

## 2024-10-01 NOTE — OB LABOR/OXYTOCIN SAFETY PROGRESS
Oxytocin Safety Progress Check Note - Chelsea Garcia 29 y.o. female MRN: [id number]    Unit/Bed#: -01 Encounter: [id number]    Dose (lauren-units/min) Oxytocin: 12 lauren-units/min  Contraction Frequency (minutes): 2-5  Contraction Intensity: Mild  Uterine Activity Characteristics: Coupling  Cervical Dilation: 6        Cervical Effacement: 90  Fetal Station: 0  Baseline Rate (FHR): 125 bpm  Fetal Heart Rate (FHT): 118 BPM  FHR Category: II             Vital Signs:   Vitals:    10/01/24 1135   BP: 93/55   Pulse: 65   Resp:    Temp:    SpO2:      Notes/comments:   Pt reporting increased rectal pressure. FHR Category II, intermittent late decels. Nursing performing maternal repositioning. [place] with contractions q3min. SVE as above. Pit running at 12, continue to titrate to contractions. Will recheck in 2 hours or sooner if clinically indicated. Anticipate . Attending physician Dr. Still aware.     Luis Tolentino MD 10/1/2024 12:07 PM

## 2024-10-02 VITALS
OXYGEN SATURATION: 99 % | BODY MASS INDEX: 35.68 KG/M2 | SYSTOLIC BLOOD PRESSURE: 114 MMHG | RESPIRATION RATE: 18 BRPM | HEIGHT: 64 IN | DIASTOLIC BLOOD PRESSURE: 79 MMHG | HEART RATE: 82 BPM | TEMPERATURE: 98.4 F | WEIGHT: 209 LBS

## 2024-10-02 PROBLEM — O13.9 GESTATIONAL HYPERTENSION: Status: ACTIVE | Noted: 2024-10-02

## 2024-10-02 LAB
ALBUMIN SERPL BCG-MCNC: 3.1 G/DL (ref 3.5–5)
ALP SERPL-CCNC: 139 U/L (ref 34–104)
ALT SERPL W P-5'-P-CCNC: 9 U/L (ref 7–52)
ANION GAP SERPL CALCULATED.3IONS-SCNC: 4 MMOL/L (ref 4–13)
AST SERPL W P-5'-P-CCNC: 20 U/L (ref 13–39)
BILIRUB SERPL-MCNC: 0.24 MG/DL (ref 0.2–1)
BUN SERPL-MCNC: 9 MG/DL (ref 5–25)
CALCIUM ALBUM COR SERPL-MCNC: 9.5 MG/DL (ref 8.3–10.1)
CALCIUM SERPL-MCNC: 8.8 MG/DL (ref 8.4–10.2)
CHLORIDE SERPL-SCNC: 105 MMOL/L (ref 96–108)
CO2 SERPL-SCNC: 26 MMOL/L (ref 21–32)
CREAT SERPL-MCNC: 0.81 MG/DL (ref 0.6–1.3)
GFR SERPL CREATININE-BSD FRML MDRD: 99 ML/MIN/1.73SQ M
GLUCOSE SERPL-MCNC: 74 MG/DL (ref 65–140)
POTASSIUM SERPL-SCNC: 4.2 MMOL/L (ref 3.5–5.3)
PROT SERPL-MCNC: 5.9 G/DL (ref 6.4–8.4)
SODIUM SERPL-SCNC: 135 MMOL/L (ref 135–147)

## 2024-10-02 PROCEDURE — 80053 COMPREHEN METABOLIC PANEL: CPT

## 2024-10-02 PROCEDURE — NC001 PR NO CHARGE: Performed by: STUDENT IN AN ORGANIZED HEALTH CARE EDUCATION/TRAINING PROGRAM

## 2024-10-02 PROCEDURE — 99024 POSTOP FOLLOW-UP VISIT: CPT | Performed by: STUDENT IN AN ORGANIZED HEALTH CARE EDUCATION/TRAINING PROGRAM

## 2024-10-02 PROCEDURE — 90707 MMR VACCINE SC: CPT

## 2024-10-02 RX ORDER — POLYETHYLENE GLYCOL 3350 17 G/17G
17 POWDER, FOR SOLUTION ORAL DAILY
Status: DISCONTINUED | OUTPATIENT
Start: 2024-10-02 | End: 2024-10-03 | Stop reason: HOSPADM

## 2024-10-02 RX ORDER — IBUPROFEN 600 MG/1
600 TABLET, FILM COATED ORAL EVERY 6 HOURS PRN
Qty: 30 TABLET | Refills: 0 | Status: SHIPPED | OUTPATIENT
Start: 2024-10-02

## 2024-10-02 RX ORDER — CALCIUM CARBONATE 500 MG/1
1000 TABLET, CHEWABLE ORAL 3 TIMES DAILY PRN
Qty: 30 TABLET | Refills: 0 | Status: SHIPPED | OUTPATIENT
Start: 2024-10-02

## 2024-10-02 RX ORDER — DOCUSATE SODIUM 100 MG/1
100 CAPSULE, LIQUID FILLED ORAL 2 TIMES DAILY
Status: DISCONTINUED | OUTPATIENT
Start: 2024-10-02 | End: 2024-10-03 | Stop reason: HOSPADM

## 2024-10-02 RX ORDER — BENZOCAINE/MENTHOL 6 MG-10 MG
1 LOZENGE MUCOUS MEMBRANE DAILY PRN
Start: 2024-10-02

## 2024-10-02 RX ORDER — ACETAMINOPHEN 325 MG/1
650 TABLET ORAL EVERY 6 HOURS PRN
Qty: 30 TABLET | Refills: 0 | Status: SHIPPED | OUTPATIENT
Start: 2024-10-02

## 2024-10-02 RX ADMIN — ACETAMINOPHEN 975 MG: 325 TABLET ORAL at 12:24

## 2024-10-02 RX ADMIN — IBUPROFEN 600 MG: 600 TABLET, FILM COATED ORAL at 18:13

## 2024-10-02 RX ADMIN — ACETAMINOPHEN 650 MG: 325 TABLET ORAL at 06:25

## 2024-10-02 RX ADMIN — IBUPROFEN 600 MG: 600 TABLET, FILM COATED ORAL at 12:24

## 2024-10-02 RX ADMIN — POLYETHYLENE GLYCOL 3350 17 G: 17 POWDER, FOR SOLUTION ORAL at 13:16

## 2024-10-02 RX ADMIN — MEASLES, MUMPS, AND RUBELLA VIRUS VACCINE LIVE 0.5 ML: 1000; 12500; 1000 INJECTION, POWDER, LYOPHILIZED, FOR SUSPENSION SUBCUTANEOUS at 18:14

## 2024-10-02 RX ADMIN — IBUPROFEN 600 MG: 600 TABLET, FILM COATED ORAL at 06:25

## 2024-10-02 RX ADMIN — ACETAMINOPHEN 325 MG: 325 TABLET ORAL at 06:29

## 2024-10-02 RX ADMIN — ACETAMINOPHEN 975 MG: 325 TABLET ORAL at 18:13

## 2024-10-02 RX ADMIN — DOCUSATE SODIUM 100 MG: 100 CAPSULE, LIQUID FILLED ORAL at 13:16

## 2024-10-02 NOTE — PLAN OF CARE
Problem: PAIN - ADULT  Goal: Verbalizes/displays adequate comfort level or baseline comfort level  Description: Interventions:  - Encourage patient to monitor pain and request assistance  - Assess pain using appropriate pain scale  - Administer analgesics based on type and severity of pain and evaluate response  - Implement non-pharmacological measures as appropriate and evaluate response  - Consider cultural and social influences on pain and pain management  - Notify physician/advanced practitioner if interventions unsuccessful or patient reports new pain  Outcome: Progressing     Problem: INFECTION - ADULT  Goal: Absence or prevention of progression during hospitalization  Description: INTERVENTIONS:  - Assess and monitor for signs and symptoms of infection  - Monitor lab/diagnostic results  - Monitor all insertion sites, i.e. indwelling lines, tubes, and drains  - Monitor endotracheal if appropriate and nasal secretions for changes in amount and color  - [place] appropriate cooling/warming therapies per order  - Administer medications as ordered  - Instruct and encourage patient and family to use good hand hygiene technique  - Identify and instruct in appropriate isolation precautions for identified infection/condition  Outcome: Progressing  Goal: Absence of fever/infection during neutropenic period  Description: INTERVENTIONS:  - Monitor WBC    Outcome: Progressing     Problem: SAFETY ADULT  Goal: Patient will remain free of falls  Description: INTERVENTIONS:  - Educate patient/family on patient safety including physical limitations  - Instruct patient to call for assistance with activity   - Consult OT/PT to assist with strengthening/mobility   - Keep Call bell within reach  - Keep bed low and locked with side rails adjusted as appropriate  - Keep care items and personal belongings within reach  - Initiate and maintain comfort rounds  - Make Fall Risk Sign visible to staff  - Apply yellow socks and bracelet  for high fall risk patients  - Consider moving patient to room near nurses station  Outcome: Progressing  Goal: Maintain or return to baseline ADL function  Description: INTERVENTIONS:  -  Assess patient's ability to carry out ADLs; assess patient's baseline for ADL function and identify physical deficits which impact ability to perform ADLs (bathing, care of mouth/teeth, toileting, grooming, dressing, etc.)  - Assess/evaluate cause of self-care deficits   - Assess range of motion  - Assess patient's mobility; develop plan if impaired  - Assess patient's need for assistive devices and provide as appropriate  - Encourage maximum independence but intervene and supervise when necessary  - Involve family in performance of ADLs  - Assess for home care needs following discharge   - Consider OT consult to assist with ADL evaluation and planning for discharge  - Provide patient education as appropriate  Outcome: Progressing  Goal: Maintains/Returns to pre admission functional level  Description: INTERVENTIONS:  - Perform AM-PAC 6 Click Basic Mobility/ Daily Activity assessment daily.  - Set and communicate daily mobility goal to care team and patient/family/caregiver.   - Collaborate with rehabilitation services on mobility goals if consulted  - Out of bed for toileting  - Record patient progress and toleration of activity level   Outcome: Progressing     Problem: Knowledge Deficit  Goal: Patient/family/caregiver demonstrates understanding of disease process, treatment plan, medications, and discharge instructions  Description: Complete learning assessment and assess knowledge base.  Interventions:  - Provide teaching at level of understanding  - Provide teaching via preferred learning methods  Outcome: Progressing     Problem: DISCHARGE PLANNING  Goal: Discharge to home or other facility with appropriate resources  Description: INTERVENTIONS:  - Identify barriers to discharge w/patient and caregiver  - Arrange for needed  discharge resources and transportation as appropriate  - Identify discharge learning needs (meds, wound care, etc.)  - Arrange for interpretive services to assist at discharge as needed  - Refer to Case Management Department for coordinating discharge planning if the patient needs post-hospital services based on physician/advanced practitioner order or complex needs related to functional status, cognitive ability, or social support system  Outcome: Progressing

## 2024-10-02 NOTE — UTILIZATION REVIEW
"NOTIFICATION OF INPATIENT ADMISSION   MATERNITY/DELIVERY AUTHORIZATION REQUEST   SERVICING FACILITY:   [facility]  Parent Child Health - L&D, [place], NICU  [address] [place] [address]  Tax ID: [id number]  NPI: [id number]   ATTENDING PROVIDER:  Attending Name and NPI#: Natalie Still Do [[id number]]  Address: [address] [place] [address]  Phone: [phone]   ADMISSION INFORMATION:  Place of Service: Inpatient [facility]  Place of Service Code: 21  Inpatient Admission Date/Time: 24  7:49 PM  Discharge Date/Time: No discharge date for patient encounter.  Admitting Diagnosis Code/Description:  Encounter for full-term uncomplicated delivery [O80]     Mother: Chelsea Garcia 1995 Estimated Date of Delivery: 24  Delivering clinician: Natalie Still   OB History          1    Para   1    Term   1            AB        Living   1         SAB        IAB        Ectopic        Multiple   0    Live Births   1               [place] Name & MRN:   Information for the patient's :  Jose, Baby Girl (Chelsea) [[id number]]    Delivery Information:  Sex: female  Delivered 10/1/2024 1:12 PM by Vaginal, Spontaneous; Gestational Age: 40w1d    [place] Measurements:  Weight: 6 lb 1 oz (2750 g);  Height: 18.5\"    APGAR 1 minute 5 minutes 10 minutes   Totals: 9 9       UTILIZATION REVIEW CONTACT:  Blaire Riojas, Utilization   Network Utilization Review Department  Phone: [phone]  Fax [phone]  Email: [email or web address]  Contact for approvals/pending authorizations, clinical reviews, and discharge.     PHYSICIAN ADVISORY SERVICES:  Medical Necessity Denial & Gnrg-mm-Lfgk Review  Phone: [phone]  Fax: [phone]  Email: [email or web address]     DISCHARGE SUPPORT TEAM:  For Patients Discharge Needs & Updates  Phone: [phone] opt. 2 Fax: [phone]  Email: [email or web address]      "

## 2024-10-02 NOTE — LACTATION NOTE
This note was copied from a baby's chart.  CONSULT - LACTATION  Baby Girl (Chelsea) Jose 1 days female MRN: [id number]    [facility] AN NURSERY Room / Bed: (N)/(N) Encounter: [id number]    Maternal Information     MOTHER:  Maryana Garcia  Maternal Age: 29 y.o.  OB History: # 1 - Date: 10/01/24, Sex: Female, Weight: 2750 g (6 lb 1 oz), GA: 40w1d, Type: Vaginal, Spontaneous, Apgar1: 9, Apgar5: 9, Living: Living, Birth Comments: None   Previouse breast reduction surgery? No; but has bilateral nipple piercings with small keloid scars     Lactation history:   Has patient previously breast fed: No   How long had patient previously breast fed:     Previous breast feeding complications:     History reviewed. No pertinent surgical history.    Birth information:  YOB: 2023   Time of birth: 1:12 PM   Sex: female   Delivery type: Vaginal, Spontaneous   Birth Weight: 2750 g (6 lb 1 oz)   Percent of Weight Change: -2%     Gestational Age: 40w1d   [unfilled]    Assessment     Breast and nipple assessment:  pendulous breasts with large, dark areolas and round, everted nipples; bilateral nipple piercings with small, keloid scars    [place] Assessment:  small oral gape, no active sucks    Feeding assessment:  first time on the breast since birth; some non-nutritive sucks noted - mixed feeding plan created  LATCH:  Latch: Repeated attempts, hold nipple in mouth, stimulate to suck   Audible Swallowing: A few with stimulation   Type of Nipple: Everted (After stimulation)   Comfort (Breast/Nipple): Soft/non-tender   Hold (Positioning): Partial assist, teach one side, mother does other, staff holds   LATCH Score: 7          Feeding recommendations:  breast feed on demand. Mom states baby did not latch at birth. Mom states glucose protocols started 6 hrs. After birth. Mom states she requested go to nursery after a night time feeding but baby never returned and mom states she was not  set up to pump. Baby received DBM from a bottle. Provided DBM handout and discussed taking DBM for home - no response yet from parents    RSB/DC and handouts reviewed.  Handouts:   Mother-led latch,   Importance of Latch  Latch Checklist  Large Breasts,    LPI,   Increase Supply,  Paced bottle,  Hand expression,      Mom has a medela pump from ins.     Mom states she wants to excl. Breastfeed.    Ed. On how to est. Milk supply. Mom requests info on supplements to take. Ed. On s2s and non-nutritive suck.      Milk Supply:   - Allow for non-nutritive suck at the breast to stimulate supply   - Allow for skin to skin during and after each breastfeeding session   - Use massage, heat, and hand expression prior to feedings to assist with deep latch   - Increase pumping sessions and pump after every feeding   - Educate self on galactagogues likes Moringa from [employer], More Milk Blend, Goat's Rue from [employer] and Boob Food Too from [employer]. Other companies that state they only harvest the leaf of the Moringa tree and other specific galactagogues include Legendary Milk and Sweet Bottom. Educational information can be found on [employer].      Demonstration and teach back of hand expression. Visible colostrum glistening on the nipple face.     Demonstration and teach back of positioning at the breast. Placed baby in the cross cradle hold / football hold on the left breast.       Medela pump: Fit flanges so nipple easily moves through tunnel. Press the on button. Pump will automatically start in stimulation mode. After 2 minutes, pump will cycle to expression mode. Use the + and - buttons to increase & decrease suction. After milk stops expressing from the nipple, press the button with the image of the milk droplets. This will take your pump back to stimulation mode. Allow pump to stimulate the breast for another 2 min. Allow the pump to move into expression mode. Cycle between Stimulation and  Expression mode at least 3 times in a 20 min. Pumping session.     Ed. On 3rd party distributors that offer smaller flanges on-line. ie) Amazon. Names of reputable companies like [employer] and [employer] offer smaller flanges for every double electric pump. Lactation Hub will also provide a set of 12 mm to 19 mm flanges to fit most flanges.Enc. To try smaller flange and place a small amount of lanolin where the tunnel and funnel meet.       Parents want DBM for home. Mom is taking bottles from [facility] Ed. On first 12 bottles are not covered under DR. GRANADO. Ed. On cost of each 100 ml bottle. Ed. On amount mom will need for bridging to milk supply being established. Handouts on [facility] [employer] info. Provided.       Discussion of General Lactation Issues: mom states she wants to excl. breastfeed      Interval Breastfeeding History:    Frequency of breast feedinst attempt today  Does mother feel breastfeeding is effective: No - mom is looking for large, active sucks  Does infant appear satisfied after nursing:If no, explain: unknown    Alternative/Artificial Feedings:   Bottle: Yes, in nursery              Breast Milk:                      Amount: DBM            Frequency Q 2-3 Hr between feedings      Mother Assessment:  Breast: pendulous breasts with large areolas and everted nipples  Nipple Assessment in General: bilateral nipple piercings with small keloid scars noted  Mother's Awareness of Feeding Cues                 Recognizes: unknown as baby has been in nursery                  Verbalizes: Yes  Support System: FOB  History of Breastfeeding: first child  Changes/Stressors/Violence: has not had a successful latch  Concerns/Goals: wants to excl. Breastfeed. Offered DBM for home and provided paperwork for DBM purchasing.       Infant Assessment:  Behaviors: early feeding cues    Latch Assessment:  Efficiency: cross cradle/ football on the Left              Lips  "Flanged: Yes, better with additional latching attempts              Depth of latch: shallow              Audible Swallow: No              Visible Milk: Yes, with HE              Wide Open/ Asymmetrical: wider gape with lower mandible massage              Suck Swallow Cycle: Breathing: yes, Coordinated: yes, some  Nipple Assessment after latch: Normal: elongated/eraser, no discoloration and no damage noted.  Latch Problems: wider gape with infant oral massage techniques demonstrated with teach-back. Enc. Alignment of nipple to nose, chin touching the breast. Ed. On breastfeeding with large/downward facing nipples.     Positional Assessment:  Infant's Ergonomics/Body               Body Alignment: Yes, with demonstration and pillows to lift the baby to the breast               Head Supported: Yes, with reminders of snug hold.               Close to Mom's body/ Lifted/ Supported: Yes, with demonstration and teach back               Mom's Ergonomics/Body: Yes                           Supported: Yes, with pillows                           Sitting Back: Yes                           Brings Baby to her breast: Yes, with teach-back  Positioning Problems: Education on creating a snug hold of your infant to the breast by verifying the infant's cheek is touching the breast, your infant's chin is deep into the breast tissue, your infant's arms are \"hugging\" the breast, and your infant's lips are flanged on the areola. Bring infant to the breast, not your breast to the infant. Latch should feel like a tugging sensation on the nipple.       Latch After Lactation Education/Consult:  Efficiency: FOB assisted with latching to the right breast in football hold.              Lips Flanged: No              Depth of latch: shallow              Audible Swallow: No              Visible Milk: Yes, with HE              Wide Open/ Asymmetrical: No              Suck Swallow Cycle: Breathing: yes, Coordinated: some as per parents  Nipple " Assessment after latch: Normal: elongated/eraser, no discoloration and no damage noted.  Latch Problems: ed. On mom bringing the baby up to the breast. FOB is holding the baby to the breast for mom. Ed. On alignment and  how to assist milk flow with breast compressions.    Demonstrated with teach back breast compressions during a feeding to increase milk transfer and stimulate suckling after a breathing/muscle break.       Position After Lactation Education/Consult:  Infant's Ergonomics/Body               Body Alignment: Yes               Head Supported: Yes, by FOB               Close to Mom's body/ Lifted/ Supported: Yes               Mom's Ergonomics/Body: Yes                           Supported: Yes                           Sitting Back: Yes                           Brings Baby to her breast: Yes  Positioning Problems: FOB holds baby to the breast. Ed. On compressions. Enc. Mom to attempt to latch.     Equipment:    Manual Pump: Yes, with 21 mm flange. Mom denies multi-user pump as she states they are going home today. Mom does not wish to stay another night.     Personal Pump            Type: Medela    Lanolin: Yes       Mom agrees to baby and me appt.  - sent EPIC MSG sent.      Feeding Plan:         Milk Supply:   - Allow for non-nutritive suck at the breast to stimulate supply   - Allow for skin to skin during and after each breastfeeding session   - Use massage, heat, and hand expression prior to feedings to assist with deep latch   - Increase pumping sessions and pump after every feeding   - Educate self on galactagogues likes Moringa from [employer], More Milk Blend, Goat's Rue from [employer] and Boob Food Too from [employer]. Other companies that state they only harvest the leaf of the Moringa tree and other specific galactagogues include Legendary Milk and Sweet Bottom. Educational information can be found on [employer].      Medela pump: Fit flanges so nipple easily moves through  tunnel. Press the on button. Pump will automatically start in stimulation mode. After 2 minutes, pump will cycle to expression mode. Use the + and - buttons to increase & decrease suction. After milk stops expressing from the nipple, press the button with the image of the milk droplets. This will take your pump back to stimulation mode. Allow pump to stimulate the breast for another 2 min. Allow the pump to move into expression mode. Cycle between Stimulation and Expression mode at least 3 times in a 20 min. Pumping session.     Ed. On 3rd party distributors that offer smaller flanges on-line. ie) Amazon. Names of reputable companies like [employer] and [employer] offer smaller flanges for every double electric pump. Lactation Hub will also provide a set of 12 mm to 19 mm flanges to fit most flanges.Enc. To try smaller flange and place a small amount of lanolin where the tunnel and funnel meet.     Feeding Plan     1. Meet early feeding cues  2. Use hand expression and nipple rolling techniques to assist with milk flow.  3. Use massage, warmth, to stimulate breasts  4. Use pillows to bring baby to the breast (shoulders back, lower back support). Make sure you can see the latch.   5. Bring baby to breast skin to skin  6. Have baby's chest against mom's torso. Baby's chin should be deeply into the breast, and nose should touch the nipple. This position will  assist with deeper latch**  7. Place opposite hand under the breast and grab the breast like a taco. Your thumb should be in front of the baby's nose and behind the areola. Move baby not breast, and bring baby to breast when mouth is wide and deep latch is achieved.  8. Allow baby to stay on the breast for up to 30 min.   9. Look for signs of satiation. If baby is not satiated and latch was painful, use expressed milk via paced bottle feeding method.   10. Place baby on opposite breast after bottle feeding for non-nutritive suck and to create supply.  11. Start next  feeding on the breast the feed finished (2nd breast).    (Scan QR code for [employer] - positions)     [employer] - positions      If baby does not meet diaper output  1. If baby does not suck with stimulation, becomes fussy, or un-latches, use breast pump to express milk.   2.  Feed expressed milk via paced bottle feeding method. Review Milkmob on youtube or scan QR code for MilkMob video  3. Bring baby back to opposite breast for non-nutritive suck and skin to skin  4. Pump after each feed to stimulate breasts and have expressed milk for next feed       Milk Mob     10. Move baby to the opposite breast and follow steps 1-8 to latch deeply.   11. Pump after each feed to stimulate breasts and have expressed milk for next feeding. Do not pump for more than 10 min.           Christy Hume, MA 10/2/2024 9:28 AM

## 2024-10-02 NOTE — PLAN OF CARE
Problem: PAIN - ADULT  Goal: Verbalizes/displays adequate comfort level or baseline comfort level  Description: Interventions:  - Encourage patient to monitor pain and request assistance  - Assess pain using appropriate pain scale  - Administer analgesics based on type and severity of pain and evaluate response  - Implement non-pharmacological measures as appropriate and evaluate response  - Consider cultural and social influences on pain and pain management  - Notify physician/advanced practitioner if interventions unsuccessful or patient reports new pain  Outcome: Progressing     Problem: INFECTION - ADULT  Goal: Absence or prevention of progression during hospitalization  Description: INTERVENTIONS:  - Assess and monitor for signs and symptoms of infection  - Monitor lab/diagnostic results  - Monitor all insertion sites, i.e. indwelling lines, tubes, and drains  - Monitor endotracheal if appropriate and nasal secretions for changes in amount and color  - [place] appropriate cooling/warming therapies per order  - Administer medications as ordered  - Instruct and encourage patient and family to use good hand hygiene technique  - Identify and instruct in appropriate isolation precautions for identified infection/condition  Outcome: Progressing  Goal: Absence of fever/infection during neutropenic period  Description: INTERVENTIONS:  - Monitor WBC    Outcome: Progressing     Problem: SAFETY ADULT  Goal: Patient will remain free of falls  Description: INTERVENTIONS:  - Educate patient/family on patient safety including physical limitations  - Instruct patient to call for assistance with activity   - Consult OT/PT to assist with strengthening/mobility   - Keep Call bell within reach  - Keep bed low and locked with side rails adjusted as appropriate  - Keep care items and personal belongings within reach  - Initiate and maintain comfort rounds  - Make Fall Risk Sign visible to staff  - Apply yellow socks and bracelet  for high fall risk patients  - Consider moving patient to room near nurses station  Outcome: Progressing  Goal: Maintain or return to baseline ADL function  Description: INTERVENTIONS:  -  Assess patient's ability to carry out ADLs; assess patient's baseline for ADL function and identify physical deficits which impact ability to perform ADLs (bathing, care of mouth/teeth, toileting, grooming, dressing, etc.)  - Assess/evaluate cause of self-care deficits   - Assess range of motion  - Assess patient's mobility; develop plan if impaired  - Assess patient's need for assistive devices and provide as appropriate  - Encourage maximum independence but intervene and supervise when necessary  - Involve family in performance of ADLs  - Assess for home care needs following discharge   - Consider OT consult to assist with ADL evaluation and planning for discharge  - Provide patient education as appropriate  Outcome: Progressing  Goal: Maintains/Returns to pre admission functional level  Description: INTERVENTIONS:  - Perform AM-PAC 6 Click Basic Mobility/ Daily Activity assessment daily.  - Set and communicate daily mobility goal to care team and patient/family/caregiver.   - Collaborate with rehabilitation services on mobility goals if consulted  - Out of bed for toileting  - Record patient progress and toleration of activity level   Outcome: Progressing     Problem: Knowledge Deficit  Goal: Patient/family/caregiver demonstrates understanding of disease process, treatment plan, medications, and discharge instructions  Description: Complete learning assessment and assess knowledge base.  Interventions:  - Provide teaching at level of understanding  - Provide teaching via preferred learning methods  Outcome: Progressing     Problem: DISCHARGE PLANNING  Goal: Discharge to home or other facility with appropriate resources  Description: INTERVENTIONS:  - Identify barriers to discharge w/patient and caregiver  - Arrange for needed  discharge resources and transportation as appropriate  - Identify discharge learning needs (meds, wound care, etc.)  - Arrange for interpretive services to assist at discharge as needed  - Refer to Case Management Department for coordinating discharge planning if the patient needs post-hospital services based on physician/advanced practitioner order or complex needs related to functional status, cognitive ability, or social support system  Outcome: Progressing

## 2024-10-02 NOTE — ASSESSMENT & PLAN NOTE
CMP wnl  Systolic (12hrs), Av , Min:117 , Max:128   Diastolic (12hrs), Av, Min:65, Max:74  - f/u CMP  - continue to monitor BP  - continue to monitor for preE sx

## 2024-10-02 NOTE — PROGRESS NOTES
"Progress Note - OB/GYN  Chelsea Garcia 29 y.o. female MRN: [id number]  Unit/Bed#: -01 Encounter: [id number]    Assessment and Plan   Chelsea Garcia is a patient of: [facility]. She is PPD1 s/p spontaneous vaginal delivery. Recovering well and stable.    *  (spontaneous vaginal delivery)  Assessment & Plan  Patient progressing toward postpartum milestones.  - Continue routine postpartum care  - Pain management with oral analgesics  - Encourage breastfeeding, familial bonding    Gestational hypertension  Assessment & Plan  CMP wnl  Systolic (12hrs), Av , Min:117 , Max:128   Diastolic (12hrs), Av, Min:65, Max:74  - f/u CMP  - continue to monitor BP  - continue to monitor for preE sx        Rubella non-immune status, antepartum  Assessment & Plan  - MMR ordered      Disposition   - Anticipate discharge home on PPD1-2    Subjective/Objective   Chief Complaint: PPD1 s/p spontaneous vaginal delivery  Subjective:    Chelsea Garcia has no current complaints. Pain is well controlled. She is currently voiding. She is ambulating. She is tolerating PO and denies nausea or vomitting. She denies chest pain, shortness of breath, lightheadedness. Lochia is normal. She is breastfeeding.    Vitals:   /68 (BP Location: Right arm)   Pulse 73   Temp 98.3 °F (36.8 °C) (Oral)   Resp 16   Ht 5' 4\" (1.626 m)   Wt 94.8 kg (209 lb)   LMP 01/10/2024 (Approximate)   SpO2 98%   Breastfeeding Yes   BMI 35.87 kg/m²       Intake/Output Summary (Last 24 hours) at 10/2/2024 0608  Last data filed at 10/1/2024 2219  Gross per 24 hour   Intake 1000 ml   Output 2180 ml   Net -1180 ml     Invasive Devices       Peripheral Intravenous Line  Duration             Peripheral IV 24 Left;Dorsal (posterior) Hand 1 day                  Physical Exam:   GEN: well-appearing, alert and oriented x3  CARDIO: regular rate  RESP: nonlabored respirations on room air  ABDOMEN: soft, nontender, nodistended, " fundus firm below the umbilicus    Labs:   Hemoglobin   Date Value Ref Range Status   09/30/2024 11.2 (L) 11.5 - 15.4 g/dL Final   08/06/2024 10.7 (L) 11.5 - 15.4 g/dL Final     WBC   Date Value Ref Range Status   09/30/2024 8.87 4.31 - 10.16 Thousand/uL Final   08/06/2024 8.55 4.31 - 10.16 Thousand/uL Final     Platelets   Date Value Ref Range Status   09/30/2024 252 149 - 390 Thousands/uL Final   08/06/2024 255 149 - 390 Thousands/uL Final     Creatinine   Date Value Ref Range Status   03/29/2024 0.60 0.60 - 1.30 mg/dL Final     Comment:     Standardized to IDMS reference method     AST   Date Value Ref Range Status   03/29/2024 15 13 - 39 U/L Final     ALT   Date Value Ref Range Status   03/29/2024 10 7 - 52 U/L Final     Comment:     Specimen collection should occur prior to Sulfasalazine administration due to the potential for falsely depressed results.           Luis Tolentino MD  OBGYN PGY-1  10/02/24  6:08 AM

## 2024-10-03 ENCOUNTER — TRANSITIONAL CARE MANAGEMENT (OUTPATIENT)
Dept: INTERNAL MEDICINE CLINIC | Facility: CLINIC | Age: 29
End: 2024-10-03

## 2024-10-03 NOTE — UTILIZATION REVIEW
NOTIFICATION OF ADMISSION DISCHARGE   This is a Notification of Discharge from [facility]. Please be advised that this patient has been discharge from our facility. Below you will find the admission and discharge date and time including the patient’s disposition.   UTILIZATION REVIEW CONTACT:  Blaire Riojas  Utilization   Network Utilization Review Department  Phone: [phone] x carefully listen to the prompts. All voicemails are confidential.  Email: [email or web address]     ADMISSION INFORMATION  PRESENTATION DATE: 9/30/2024  7:49 PM  OBERVATION ADMISSION DATE: N/A  INPATIENT ADMISSION DATE: 9/30/24  7:49 PM   DISCHARGE DATE: 10/2/2024  9:30 PM   DISPOSITION:Home/Self Care    Network Utilization Review Department  ATTENTION: Please call with any questions or concerns to [phone] and carefully listen to the prompts so that you are directed to the right person. All voicemails are confidential.   For Discharge needs, contact Care Management DC Support Team at [phone] opt. 2  Send all requests for admission clinical reviews, approved or denied determinations and any other requests to dedicated fax number below belonging to the campus where the patient is receiving treatment. List of dedicated fax numbers for the Facilities:  FACILITY NAME UR FAX NUMBER   ADMISSION DENIALS (Administrative/Medical Necessity) [phone]   DISCHARGE SUPPORT TEAM ([facility]) [phone]   PARENT CHILD HEALTH (Maternity/NICU/Pediatrics) [phone]   [facility] [phone]   General acute hospital [phone]   [facility] [phone]   [facility] [phone]   [facility] [phone]   [facility] [phone]   [facility] [phone]   [facility] [phone]    [facility] [phone]   [facility] [phone]   [facility] [phone]   [facility] [phone]

## 2024-10-03 NOTE — PLAN OF CARE
Problem: PAIN - ADULT  Goal: Verbalizes/displays adequate comfort level or baseline comfort level  Description: Interventions:  - Encourage patient to monitor pain and request assistance  - Assess pain using appropriate pain scale  - Administer analgesics based on type and severity of pain and evaluate response  - Implement non-pharmacological measures as appropriate and evaluate response  - Consider cultural and social influences on pain and pain management  - Notify physician/advanced practitioner if interventions unsuccessful or patient reports new pain  Outcome: Completed     Problem: INFECTION - ADULT  Goal: Absence or prevention of progression during hospitalization  Description: INTERVENTIONS:  - Assess and monitor for signs and symptoms of infection  - Monitor lab/diagnostic results  - Monitor all insertion sites, i.e. indwelling lines, tubes, and drains  - Monitor endotracheal if appropriate and nasal secretions for changes in amount and color  - [place] appropriate cooling/warming therapies per order  - Administer medications as ordered  - Instruct and encourage patient and family to use good hand hygiene technique  - Identify and instruct in appropriate isolation precautions for identified infection/condition  Outcome: Completed  Goal: Absence of fever/infection during neutropenic period  Description: INTERVENTIONS:  - Monitor WBC    Outcome: Completed     Problem: SAFETY ADULT  Goal: Patient will remain free of falls  Description: INTERVENTIONS:  - Educate patient/family on patient safety including physical limitations  - Instruct patient to call for assistance with activity   - Consult OT/PT to assist with strengthening/mobility   - Keep Call bell within reach  - Keep bed low and locked with side rails adjusted as appropriate  - Keep care items and personal belongings within reach  - Initiate and maintain comfort rounds  - Make Fall Risk Sign visible to staff  - Apply yellow socks and bracelet for  high fall risk patients  - Consider moving patient to room near nurses station  Outcome: Completed  Goal: Maintain or return to baseline ADL function  Description: INTERVENTIONS:  -  Assess patient's ability to carry out ADLs; assess patient's baseline for ADL function and identify physical deficits which impact ability to perform ADLs (bathing, care of mouth/teeth, toileting, grooming, dressing, etc.)  - Assess/evaluate cause of self-care deficits   - Assess range of motion  - Assess patient's mobility; develop plan if impaired  - Assess patient's need for assistive devices and provide as appropriate  - Encourage maximum independence but intervene and supervise when necessary  - Involve family in performance of ADLs  - Assess for home care needs following discharge   - Consider OT consult to assist with ADL evaluation and planning for discharge  - Provide patient education as appropriate  Outcome: Completed  Goal: Maintains/Returns to pre admission functional level  Description: INTERVENTIONS:  - Perform AM-PAC 6 Click Basic Mobility/ Daily Activity assessment daily.  - Set and communicate daily mobility goal to care team and patient/family/caregiver.   - Collaborate with rehabilitation services on mobility goals if consulted  - Out of bed for toileting  - Record patient progress and toleration of activity level   Outcome: Completed     Problem: Knowledge Deficit  Goal: Patient/family/caregiver demonstrates understanding of disease process, treatment plan, medications, and discharge instructions  Description: Complete learning assessment and assess knowledge base.  Interventions:  - Provide teaching at level of understanding  - Provide teaching via preferred learning methods  Outcome: Completed     Problem: DISCHARGE PLANNING  Goal: Discharge to home or other facility with appropriate resources  Description: INTERVENTIONS:  - Identify barriers to discharge w/patient and caregiver  - Arrange for needed discharge  resources and transportation as appropriate  - Identify discharge learning needs (meds, wound care, etc.)  - Arrange for interpretive services to assist at discharge as needed  - Refer to Case Management Department for coordinating discharge planning if the patient needs post-hospital services based on physician/advanced practitioner order or complex needs related to functional status, cognitive ability, or social support system  Outcome: Completed

## 2024-10-07 LAB — PLACENTA IN STORAGE: NORMAL

## 2024-10-09 ENCOUNTER — NURSE TRIAGE (OUTPATIENT)
Age: 29
End: 2024-10-09

## 2024-10-09 NOTE — TELEPHONE ENCOUNTER
"Patient delivered 10/01/24 via . Patient reports \"lightening crotch\", but states the pain is in her anus rather than crotch area. Began 3 days ago, more consistent today. States pain comes and goes quickly, but when it is present, it is an 8/10. Takes ibuprofen but does not seem to help, especially since pain is random. States pain happens mostly when sitting down. Denies constipation or concerns moving bowels.     Advised can be a result of delivery, should improve in next several weeks. Advised to monitor and call back if pain is severe and does not go away. Patient has upcoming postpartum appointment tomorrow and plans on discussing further with provider. Routing to upcoming provider as fyi.  Reason for Disposition   MILD TO MODERATE pain that comes and goes (cramps) lasts > 24 hours    Answer Assessment - Initial Assessment Questions  1. LOCATION: \"Where does it hurt?\"       Anus  2. RADIATION: \"Does the pain shoot anywhere else?\" (e.g., lower back, groin, thighs)      Denies  3. ONSET: \"When did the pain begin?\" (e.g., minutes, hours or days ago)       Few days ago  4. SUDDEN: \"Gradual or sudden onset?\"      Sudden  5. PATTERN \"Does the pain come and go, or is it constant?\"     - If constant: \"Is it getting better, staying the same, or worsening?\"       (Note: Constant means the pain never goes away completely; most serious pain is constant and gets worse over time)      - If intermittent: \"How long does it last?\" \"Do you have pain now?\"      (Note: Intermittent means the pain goes away completely between bouts)      Intermmittent  6. SEVERITY: \"How bad is the pain?\"  (e.g., Scale 1-10; mild, moderate, or severe)    - MILD (1-3): doesn't interfere with normal activities, area soft and not tender to touch     - MODERATE (4-7): interferes with normal activities or awakens from sleep, tender to touch     - SEVERE (8-10): excruciating pain, doubled over, unable to do any normal activities       8/10  7. " "RECURRENT SYMPTOM: \"Have you ever had this type of pelvic pain before?\" If Yes, ask: \"When was the last time?\" and \"What happened that time?\"       In pregnancy, had \"lightening crotch\" but in postpartum feeling same pain in anus  8. CAUSE: \"What do you think is causing the pelvic pain?\"  Unsure  9. RELIEVING/AGGRAVATING FACTORS: \"What makes it better or worse?\" (e.g., activity/rest, sexual intercourse, voiding, passing stool)      Sitting too long  10. OTHER SYMPTOMS: \"Has there been any other symptoms?\" (e.g., fever, vaginal bleeding, vaginal discharge, diarrhea, constipation, or voiding problems?\"        Denies  11. PREGNANCY: \"Is there any chance you are pregnant?\" \"When was your last menstrual period?\"        Postpartum    Protocols used: Pelvic Pain - Female-ADULT-[place]    "

## 2024-10-10 ENCOUNTER — POSTPARTUM VISIT (OUTPATIENT)
Dept: OBGYN CLINIC | Facility: MEDICAL CENTER | Age: 29
End: 2024-10-10

## 2024-10-10 VITALS
BODY MASS INDEX: 33.46 KG/M2 | HEIGHT: 64 IN | SYSTOLIC BLOOD PRESSURE: 142 MMHG | WEIGHT: 196 LBS | DIASTOLIC BLOOD PRESSURE: 90 MMHG

## 2024-10-10 DIAGNOSIS — K62.89 RECTAL PAIN: ICD-10-CM

## 2024-10-10 PROCEDURE — 99024 POSTOP FOLLOW-UP VISIT: CPT | Performed by: NURSE PRACTITIONER

## 2024-10-10 RX ORDER — ADHESIVE BANDAGE 3/4"
BANDAGE TOPICAL
Qty: 1 EACH | Refills: 0 | Status: SHIPPED | OUTPATIENT
Start: 2024-10-10

## 2024-10-10 NOTE — PROGRESS NOTES
"Chelsea Mijares [place]  1995    S:  29 y.o. female here for postpartum visit.  She is s/p Uncomplicated vaginal delivery on 10/1/24. Perineal and right labial laceration were repaired. Dx of gHTN on 10/2/24. She was unaware of this diagnosis and didn't know why she was coming in today.   /90 and asymptomatic.     Admits to intermittent rectal pain rated 8/10 at worst.None now.  Lasts a minute and is self limiting. No bowel concerns. Admits to slight straining with BM x 1 occurrence when she notices the pain. More sensitive \"when I'm gassy.\"     Gender: female \"Bee\". Doing well.   Apgars: 9, 9  Weight: 6 lbs 1 oz. Had 2 pedi appts already and is past her birth weight.   Her lochia has resolved.    She is breastfeeding without problems.   She denies postpartum blues/depression.  Her EPDS is 2.    Substance use screen:0    Last Pap: 03/21/2024 normal       O:   LMP 01/10/2024 (Approximate)   She appears well and in no distress  Abdomen is soft and nontender  External genitals are normal  Vagina is normal  Cervix, uterus and adnexa are nontender, no masses palpable. Rectovagianl wall is normal. No masses or tenderness appreciated Declined rectal exam.     A/P:  Postpartum follow up visit.   Blood pressure 142/90.  Blood pressure cuff ordered. She will monitor at home calling office with findings of 140/90 or higher.   Return to office in one week for follow up BP check.   Call office with any persistent or worsening rectal pain.   Avoid straining with BM's.   Increase po water intake to avoid constipation.   Consider taking OTC colace as directed for stool softening.   Continue prenatal vitamin daily while breastfeeding   Consider taking prenatal vitamin 6-12 months before a future pregnancy to reduce risk of neural tube defect.  MMR received 10/2/24.  If symptoms of depression occur, please call the office to schedule an appt. This may happen up until your baby's first birthday.  Return to office in 2 weeks " for postpartum visit.

## 2024-10-10 NOTE — PATIENT INSTRUCTIONS
Blood pressure 142/90.  Blood pressure cuff ordered. She will monitor at home calling office with findings of 140/90 or higher.   Return to office in one week for follow up BP check.   Call office with any persistent or worsening rectal pain.   Avoid straining with BM's.   Increase po water intake to avoid constipation.   Consider taking OTC colace as directed for stool softening.   Continue prenatal vitamin daily while breastfeeding   Consider taking prenatal vitamin 6-12 months before a future pregnancy to reduce risk of neural tube defect.  MMR received 10/2/24.  If symptoms of depression occur, please call the office to schedule an appt. This may happen up until your baby's first birthday.  Return to office in 2 weeks for postpartum visit.

## 2024-10-11 ENCOUNTER — OFFICE VISIT (OUTPATIENT)
Dept: POSTPARTUM | Facility: CLINIC | Age: 29
End: 2024-10-11
Payer: COMMERCIAL

## 2024-10-11 PROCEDURE — 99404 PREV MED CNSL INDIV APPRX 60: CPT | Performed by: PEDIATRICS

## 2024-10-11 NOTE — PROGRESS NOTES
"INITIAL BREAST FEEDING EVALUATION    Informant/Relationship: Maryana (mom/self), Dino (FOB)     Discussion of General Lactation Issues: Maryana is here today for some reassurance with feedings. Baby tends to latch on \"aggressively\" causing nipple damage. She is using silverettes and nipple balm.     She is able to get comfortable latches at time, unsure how to be consistent with this.     Infant is 10 days old today.        History:  Fertility Problem:no  Breast changes:yes - tenderness, slight enlargement  :  elective induction, less than 24 hours, pushed 3 times.   Full term:yes - 40 and 1    labor:no  First nursing/attempt < 1 hour after birth:yes - within an hour, unsure if this was good. Nipple tenderness, but she is tender at baseline   Skin to skin following delivery:yes - immediately   Breast changes after delivery:yes - day 3 postpartum started increased by day 8   Rooming in (infant in room with mother with exception of procedures, eg. Circumcision: NBN for one night to get some sleepy   Blood sugar issues: monitoring, checked before feedings   NICU stay:no  Jaundice:no  Phototherapy:no  Supplement given: (list supplement and method used as well as reason(s): given DMB in the hospital, due to low production     Past Medical History:   Diagnosis Date    Migraine     hx of migraines    Urinary tract infection          Current Outpatient Medications:     acetaminophen (TYLENOL) 325 mg tablet, Take 2 tablets (650 mg total) by mouth every 6 (six) hours as needed for mild pain or moderate pain, Disp: 30 tablet, Rfl: 0    benzocaine-menthol-lanolin-aloe (DERMOPLAST) 20-0.5 % topical spray, Apply 1 Application topically every 6 (six) hours as needed for mild pain or irritation, Disp: , Rfl:     Blood Pressure Monitoring (Blood Pressure Cuff) MISC, Take BP once daily. Calling office with findings of 140/90 or higher., Disp: 1 each, Rfl: 0    calcium carbonate (TUMS) 500 mg chewable tablet, Chew 2 " tablets (1,000 mg total) 3 (three) times a day as needed for indigestion or heartburn (Patient not taking: Reported on 10/10/2024), Disp: 30 tablet, Rfl: 0    Ferrous Sulfate (Iron) 28 MG TABS, Take by mouth SUPPLEMENT HAS VIT C AND B12, Disp: , Rfl:     hydrocortisone 1 % cream, Apply 1 Application topically daily as needed for irritation or rash, Disp: , Rfl:     ibuprofen (MOTRIN) 600 mg tablet, Take 1 tablet (600 mg total) by mouth every 6 (six) hours as needed for mild pain or moderate pain, Disp: 30 tablet, Rfl: 0    NON FORMULARY, 1,000 mcg FOLIC ACID (Patient not taking: Reported on 10/10/2024), Disp: , Rfl:     Prenatal Vit-Fe Sulfate-FA (PRENATAL MULTIVIT-IRON PO), Take by mouth, Disp: , Rfl:     witch hazel-glycerin (TUCKS) topical pad, Apply 1 Pad topically every 4 (four) hours as needed for irritation, Disp: , Rfl:     Allergies   Allergen Reactions    Latex Rash    Plum Pulp - Food Allergy Rash       Social History     Substance and Sexual Activity   Drug Use Never    Comment: denies self, FOB denies, FOB's mother- etoh abuse       Social History     Interval Breastfeeding History:    Frequency of breast feeding: on demand, every 2-3 hours, she does start cluster feeding around 10 pm   Does mother feel breastfeeding is effective: Yes  Does infant appear satisfied after nursing:Yes  Stooling pattern normal: Yes  Urinating frequently:Yes  Using shield or shells: No    Alternative/Artificial Feedings:   Bottle: Yes, volufeed bottles   Cup: No  Syringe/Finger: No           Formula Type: no                     Amount: n/a            Breast Milk:                      Amount: 2 ounces             Frequency Q not regular bottles, to give Mom a break, about 1 x per day   Elimination Problems: No      Equipment:    Silverette cup use throughout the day and night. Placing over nipple cream.     Pump            Type: Medela, [employer] wearable             Frequency of Use: 3 x a day with wearable.     Collecting 5  ounces in the am, 1-2 ounces during the day.       Equipment Problems: no    Mom:  Breast: Normal  Nipple Assessment in General: Normal: elongated/eraser, no discoloration and no damage noted. Scars from nipple piercings noted    Mother's Awareness of Feeding Cues                 Recognizes: Yes                  Verbalizes: Yes  Support System: FOB, friends   History of Breastfeeding: first time breastfeeding   Changes/Stressors/Violence: nipple pain, baby seems to be biting on the breast, first time Mom  Concerns/Goals: Maryana desires to ensure however baby is fed she is growing well. She desires to be comfortable when breastfeeding.      Problems with Mom: first time breast feeder, nipple pain     Physical Exam  Constitutional:       Appearance: Normal appearance.   HENT:      Head: Normocephalic.   Pulmonary:      Effort: Pulmonary effort is normal.   Musculoskeletal:         General: Normal range of motion.      Cervical back: Normal range of motion.   Neurological:      General: No focal deficit present.      Mental Status: She is alert and oriented to person, place, and time.   Skin:     General: Skin is warm.      Capillary Refill: Capillary refill takes less than 2 seconds.   Psychiatric:         Mood and Affect: Mood normal.         Behavior: Behavior normal.         Thought Content: Thought content normal.         Judgment: Judgment normal.         Infant:  Behaviors: Alert  Color: Pink  Birth weight: 2750 g   Current weight: 2845 g     Problems with infant: none       General Appearance:  Alert, active, no distress                            Head:  Normocephalic, AFOF, sutures opposed                            Eyes:   Conjunctiva clear, no drainage                            Ears:   Normally placed, no anomolies                           Nose:   Septum intact, no drainage or erythema                          Mouth:  No lesions. Tongue is at rest at the roof of her mouth, lateralizes well, extends past  lip. Full cup on gloved finger, she is compressing when sucking but improves with cheek support. Manual lift shows rounded tongue tip and elastic frenulum, connecting well behind her lower alveolar ridge and tongue tip.                    Neck:  Supple, symmetrical, trachea midline                Respiratory:  No grunting, flaring, retractions, breath sounds clear and equal           Cardiovascular:  Regular rate and rhythm. No murmur. Adequate perfusion/capillary refill. Femoral pulse present                  Abdomen:    Soft, non-tender, no masses, bowel sounds present, no HSM            Genitourinary:  Normal female genitalia, anus patent                         Spine:   No abnormalities noted       Musculoskeletal:   Full range of motion         Skin/Hair/Nails:   Skin warm, dry, and intact, no rashes or abnormal dyspigmentation or lesions               Neurologic:   No abnormal movement, tone appropriate for gestational age     Latch:  Efficiency:               Lips Flanged: Yes              Depth of latch: wide              Audible Swallow: Yes, every 2-4 sucks on the left breast, every 1-2 on the right               Visible Milk: Yes              Wide Open/ Asymmetrical: Yes              Suck Swallow Cycle: Breathing: yes, Coordinated: yes  Nipple Assessment after latch: Normal: elongated/eraser, no discoloration and no damage noted.  Latch Problems: Baby gapes well, shallow attachment initially but improves with positioning adjustments. Mom reports strong, uncomfortable suction initially but that is improved quickly.     Position:  Infant's Ergonomics/Body               Body Alignment: Yes               Head Supported: Yes               Close to Mom's body/ Lifted/ Supported: Yes               Mom's Ergonomics/Body: Yes                           Supported: Yes                           Sitting Back: Yes                           Brings Baby to her breast: Yes  Positioning Problems: Reviewed BN xavier  it does require some practice and review to support mom's comfort with this hold, once in place both appear comfortable.       Education:  Reviewed Latch: importance of deep latch without pain.   Reviewed Positioning for Dyad: proper alignment and head angle when positioning at the breast   Reviewed Frequency/Supply & Demand: offer the breast at each feeding, pump if baby is not latching and effective transferring milk.   Reviewed Infant:Cues and varied States of Awareness: watch for hunger cues, feed on demand. If baby seems satisfied at the breast (calm, relaxed sleeping, breasts are softer) no need to pump or supplement   Reviewed Infant Elimination: goal of 6+ wets and 2-3 stools per day   Reviewed Alternative/Artificial Feedings: paced bottle feeding technique demonstrated  Reviewed Mom/Breast care: gentle handling of the breast at all times,  as well as tips for safely healing sore nipples.  Risk of prolonged silverette use  Reviewed Equipment: Hand pump and electric pump general guidance, Discussed proper flange fit, how to measure        Plan:      Reassurance provided that baby is growing well at this time. Cont with positioning adjustments and watch for signs of effective feeding. Pump only if wanting to replace feeding at the breast with bottle feeding or if latching becomes painful. Recommended cream of choice and cover with wax or parchment paper in between feedings to promote healing. Gentle handling of the breast at all times to preserve integrity.  Contact [facility] for breastfeeding support as needed or ongoing concerns with latching comfort and milk transfer.     I have spent 60 minutes with Patient and family today in which greater than 50% of this time was spent in counseling/coordination of care regarding Patient and family education.

## 2024-10-11 NOTE — PATIENT INSTRUCTIONS
"-Great meeting with your and your sweet family today! Bee is a doll baby!   -Watch for signs throughout the feeding that she is latched deeply and is effectively removing milk. You will feel strong tugging, hear swallowing and will feel your breasts get softer after nursing.   -No need to pump if baby is latching and feeding well at the breast on demand.   -Pump only as needed for missed feedings at the breast or for uncomfortable engorgement, utilize flange fit and settings that are comfortable for you, pumping should not be painful!   -If goal to save some \"rainy day\" milk, save only small (1-3 oz) volumes per day (or less).  -Healing techniques for nipples : nipple balm of choice and airflow, or apply balm and cover with a piece of wax or parchment paper over top in between feedings.   - Storing and Thawing Breast Milk  [facility] Breastfeeding Support ([employer]gov)    -Please call or scheduled a follow up appointment with lactation as needed for questions or concerns you may have.     "

## 2024-10-16 ENCOUNTER — POSTPARTUM VISIT (OUTPATIENT)
Dept: OBGYN CLINIC | Facility: CLINIC | Age: 29
End: 2024-10-16

## 2024-10-16 VITALS — SYSTOLIC BLOOD PRESSURE: 140 MMHG | WEIGHT: 194.8 LBS | BODY MASS INDEX: 33.44 KG/M2 | DIASTOLIC BLOOD PRESSURE: 82 MMHG

## 2024-10-16 PROCEDURE — 99024 POSTOP FOLLOW-UP VISIT: CPT | Performed by: OBSTETRICS & GYNECOLOGY

## 2024-10-16 NOTE — PROGRESS NOTES
I have reviewed the notes, assessments, and/or procedures performed by Taylor Jacobs RN, IBCLC, I concur with her/his documentation of Chelsea Gold MD 10/16/24

## 2024-10-16 NOTE — PROGRESS NOTES
Chelsea Mijares Lyman  1995    S:  29 y.o. female here for blood pressure check    She is s/p delivery on 10/10/2024.       Her pregnancy was complicated by gestational HTN.  She was seen one week ago for a postpartum BP check which was similar to today's BP.  She did buy a BP cuff but just recently.  Her BP at home is similar.  She has had headaches on and off but never persistent.  She is a headache sufferer.      She is currently maintained off medication for her blood pressure.     Past Medical History:   Diagnosis Date    Migraine     hx of migraines    Urinary tract infection      Social History     Socioeconomic History    Marital status: Single     Spouse name: Not on file    Number of children: Not on file    Years of education: Not on file    Highest education level: Not on file   Occupational History    Not on file   Tobacco Use    Smoking status: Never     Passive exposure: Never    Smokeless tobacco: Never   Vaping Use    Vaping status: Never Used   Substance and Sexual Activity    Alcohol use: Not Currently    Drug use: Never     Comment: denies self, FOB denies, FOB's mother- etoh abuse    Sexual activity: Yes     Partners: Male     Comment: Dino-FOB   Other Topics Concern    Not on file   Social History Narrative    Not on file     Social Determinants of Health     Financial Resource Strain: Not on file   Food Insecurity: No Food Insecurity (9/30/2024)    Hunger Vital Sign     Worried About Running Out of Food in the Last Year: Never true     Ran Out of Food in the Last Year: Never true   Transportation Needs: No Transportation Needs (9/30/2024)    PRAPARE - Transportation     Lack of Transportation (Medical): No     Lack of Transportation (Non-Medical): No   Physical Activity: Not on file   Stress: Not on file   Social Connections: Not on file   Intimate Partner Violence: Not on file   Housing Stability: Low Risk  (9/30/2024)    Housing Stability Vital Sign     Unable to Pay for Housing in the  Last Year: No     Number of Times Moved in the Last Year: 0     Homeless in the Last Year: No     Family History   Problem Relation Age of Onset    No Known Problems Mother     Hypertension Father     Stroke Father     No Known Problems Maternal Grandmother     No Known Problems Maternal Grandfather     No Known Problems Paternal Grandmother     No Known Problems Paternal Grandfather        Current Outpatient Medications:     acetaminophen (TYLENOL) 325 mg tablet, Take 2 tablets (650 mg total) by mouth every 6 (six) hours as needed for mild pain or moderate pain, Disp: 30 tablet, Rfl: 0    Blood Pressure Monitoring (Blood Pressure Cuff) MISC, Take BP once daily. Calling office with findings of 140/90 or higher., Disp: 1 each, Rfl: 0    Ferrous Sulfate (Iron) 28 MG TABS, Take by mouth SUPPLEMENT HAS VIT C AND B12, Disp: , Rfl:     ibuprofen (MOTRIN) 600 mg tablet, Take 1 tablet (600 mg total) by mouth every 6 (six) hours as needed for mild pain or moderate pain, Disp: 30 tablet, Rfl: 0    Prenatal Vit-Fe Sulfate-FA (PRENATAL MULTIVIT-IRON PO), Take by mouth, Disp: , Rfl:     benzocaine-menthol-lanolin-aloe (DERMOPLAST) 20-0.5 % topical spray, Apply 1 Application topically every 6 (six) hours as needed for mild pain or irritation (Patient not taking: Reported on 10/16/2024), Disp: , Rfl:     calcium carbonate (TUMS) 500 mg chewable tablet, Chew 2 tablets (1,000 mg total) 3 (three) times a day as needed for indigestion or heartburn (Patient not taking: Reported on 10/10/2024), Disp: 30 tablet, Rfl: 0    hydrocortisone 1 % cream, Apply 1 Application topically daily as needed for irritation or rash (Patient not taking: Reported on 10/16/2024), Disp: , Rfl:     NON FORMULARY, 1,000 mcg FOLIC ACID (Patient not taking: Reported on 10/10/2024), Disp: , Rfl:     witch hazel-glycerin (TUCKS) topical pad, Apply 1 Pad topically every 4 (four) hours as needed for irritation (Patient not taking: Reported on 10/16/2024), Disp: ,  Rfl:     O:  She appears well and is in no distress  Vitals:    10/16/24 1513   BP: 140/82     Abdomen is soft and nontender  There is no edema in the feet, ankles or legs    A/P:  Postpartum blood pressure check. Patient advised to call with any headaches that are unrelieved by tylenol, increase in edema.     Return in 1 week for routine postpartum check and BP recheck, sooner PRN

## 2024-10-17 ENCOUNTER — TELEPHONE (OUTPATIENT)
Dept: OBGYN CLINIC | Facility: CLINIC | Age: 29
End: 2024-10-17

## 2024-10-24 ENCOUNTER — POSTPARTUM VISIT (OUTPATIENT)
Dept: OBGYN CLINIC | Facility: CLINIC | Age: 29
End: 2024-10-24

## 2024-10-24 VITALS — WEIGHT: 194.8 LBS | BODY MASS INDEX: 33.44 KG/M2 | DIASTOLIC BLOOD PRESSURE: 78 MMHG | SYSTOLIC BLOOD PRESSURE: 136 MMHG

## 2024-10-24 PROCEDURE — 99024 POSTOP FOLLOW-UP VISIT: CPT | Performed by: OBSTETRICS & GYNECOLOGY

## 2024-10-24 NOTE — PROGRESS NOTES
"Chelsea Mijares Montgomery  1995    S:  29 y.o. female here for postpartum visit.  She is s/p Uncomplicated vaginal delivery on 10/1/2024.   Gender: female   Apgars: 9/9  Weight: 6 lbs 1 oz  Her lochia has resolved.    She is Breastfeeding without problems.   Her pregnancy was complicated by: GBS positive, Rubella non-immune, postpartum gestational HTN.  She denies postpartum blues/depression.  [place] score was 3.    We discussed contraceptive options in detail including birth control pills, patches, NuvaRing, DepoProvera, Mirena/Kyleena IUD, Nexplanon in detail.  At this point she would like Mirena vs Nexplanon.  Brochures given. Hx bad cramps but aware cannot do combined BCP with breastfeeding.  Considering other \"set it and forget it\" options.      O:   She appears well and in no distress  Abdomen is soft and nontender  External genitals are normal; +small amount of suture material on right labia but underlying tissue intact; discussed expectations  Vagina is normal  Cervix, uterus and adnexa are nontender, no masses palpable.     A/P:  Postpartum visit.   She will return in 2 months for her yearly exam, sooner PRN.    Call with decision about contraception to schedule insertion      "

## 2024-11-05 ENCOUNTER — TELEPHONE (OUTPATIENT)
Age: 29
End: 2024-11-05

## 2024-11-05 NOTE — TELEPHONE ENCOUNTER
Patient called she is postpartum she had her baby in Sept , she wants to make an appt for nexplanon insertion with Dr Lopez , the first appt available is in Feb , patient can not wait that long she would like an appt  ASAP

## 2024-11-11 ENCOUNTER — PROCEDURE VISIT (OUTPATIENT)
Dept: OBGYN CLINIC | Facility: CLINIC | Age: 29
End: 2024-11-11
Payer: COMMERCIAL

## 2024-11-11 VITALS — WEIGHT: 195 LBS | BODY MASS INDEX: 33.47 KG/M2 | DIASTOLIC BLOOD PRESSURE: 70 MMHG | SYSTOLIC BLOOD PRESSURE: 120 MMHG

## 2024-11-11 DIAGNOSIS — Z32.02 PREGNANCY TEST NEGATIVE: ICD-10-CM

## 2024-11-11 DIAGNOSIS — Z30.017 ENCOUNTER FOR INITIAL PRESCRIPTION OF NEXPLANON: Primary | ICD-10-CM

## 2024-11-11 PROBLEM — Z3A.39 39 WEEKS GESTATION OF PREGNANCY: Status: RESOLVED | Noted: 2024-03-12 | Resolved: 2024-11-11

## 2024-11-11 PROBLEM — O09.899 RUBELLA NON-IMMUNE STATUS, ANTEPARTUM: Status: RESOLVED | Noted: 2024-03-29 | Resolved: 2024-11-11

## 2024-11-11 PROBLEM — Z34.03 ENCOUNTER FOR SUPERVISION OF NORMAL FIRST PREGNANCY IN THIRD TRIMESTER: Status: RESOLVED | Noted: 2024-07-21 | Resolved: 2024-11-11

## 2024-11-11 PROBLEM — O99.820 GBS (GROUP B STREPTOCOCCUS CARRIER), +RV CULTURE, CURRENTLY PREGNANT: Status: RESOLVED | Noted: 2024-09-18 | Resolved: 2024-11-11

## 2024-11-11 PROBLEM — O13.9 GESTATIONAL HYPERTENSION: Status: RESOLVED | Noted: 2024-10-02 | Resolved: 2024-11-11

## 2024-11-11 PROBLEM — O99.210 OBESITY AFFECTING PREGNANCY, ANTEPARTUM: Status: RESOLVED | Noted: 2024-03-25 | Resolved: 2024-11-11

## 2024-11-11 PROBLEM — Z28.39 RUBELLA NON-IMMUNE STATUS, ANTEPARTUM: Status: RESOLVED | Noted: 2024-03-29 | Resolved: 2024-11-11

## 2024-11-11 LAB — SL AMB POCT URINE HCG: NEGATIVE

## 2024-11-11 PROCEDURE — 81025 URINE PREGNANCY TEST: CPT | Performed by: STUDENT IN AN ORGANIZED HEALTH CARE EDUCATION/TRAINING PROGRAM

## 2024-11-11 PROCEDURE — 11981 INSERTION DRUG DLVR IMPLANT: CPT | Performed by: STUDENT IN AN ORGANIZED HEALTH CARE EDUCATION/TRAINING PROGRAM

## 2024-11-11 NOTE — PROGRESS NOTES
Ambulatory Visit  Name: Chelsea Garcia      : 1995      MRN: [id number]  Encounter Provider: Lissette Armas MD  Encounter Date: 2024   Encounter department: [facility] [place]    Assessment & Plan  Encounter for initial prescription of Nexplanon  Reviewed safe and effective use of Nexplanon device. Inserted without difficulty today.     Orders:    etonogestrel (NEXPLANON) subdermal implant 68 mg    Remove and insert drug implant    Pregnancy test negative    Orders:    POCT urine HCG      History of Present Illness     Chelsea Garcia is a 29 y.o. female who presents for contraceptive consultation, placement of Nexplanon device. Was seen 10/24 and counseled regarding LARC. Leaning towards Mirena vs. Nexplanon at that time.       Review of Systems no dyspareunia, amenorrhea        Objective     /70 (BP Location: Right arm, Patient Position: Sitting, Cuff Size: Standard)   Wt 88.5 kg (195 lb)   LMP 01/10/2024 (Approximate)   Breastfeeding Yes   BMI 33.47 kg/m²     Physical Exam  Constitutional:       Appearance: She is well-developed.   HENT:      Head: Normocephalic and atraumatic.   Eyes:      Pupils: Pupils are equal, round, and reactive to light.   Cardiovascular:      Rate and Rhythm: Normal rate.   Pulmonary:      Effort: Pulmonary effort is normal.   Abdominal:      Palpations: Abdomen is soft.   Musculoskeletal:      Cervical back: Normal range of motion.   Neurological:      Mental Status: She is alert and oriented to person, place, and time.   Psychiatric:         Behavior: Behavior normal.

## 2024-11-11 NOTE — PROGRESS NOTES
"Universal Protocol:  procedure performed by consultantConsent: Verbal consent obtained.  Risks and benefits: risks, benefits and alternatives were discussed  Consent given by: patient  Time out: Immediately prior to procedure a \"time out\" was called to verify the correct patient, procedure, equipment, support staff and site/side marked as required.  Patient understanding: patient states understanding of the procedure being performed  Patient consent: the patient's understanding of the procedure matches consent given  Procedure consent: procedure consent matches procedure scheduled  Test results: test results available and properly labeled  Site marked: the operative site was marked  Required items: required blood products, implants, devices, and special equipment available  Patient identity confirmed: verbally with patient  Remove and insert drug implant    Date/Time: 11/11/2024 1:30 PM    Performed by: Lissette Armas MD  Authorized by: Lissette Armas MD    Indication:     Indication: Insertion of non-biodegradable drug delivery implant    Pre-procedure:     Pre-procedure timeout performed: yes      Prepped with: alcohol 70% and povidone-iodine      Local anesthetic:  Lidocaine without epinephrine    The site was cleaned and prepped in a sterile fashion: yes    Procedure:     Procedure:  Insertion    Left/right:  Right    Preloaded contraceptive capsule trocar was placed subdermally: yes      Visualization of implant was obtained: yes      Contraceptive capsule was inserted and trocar removed: yes      Visualization of notch in stylet and palpation of device: yes      Palpation confirms placement by provider and patient: yes      Site was closed with steri-strips and pressure bandage applied: yes      "

## 2024-12-09 ENCOUNTER — ANNUAL EXAM (OUTPATIENT)
Dept: OBGYN CLINIC | Facility: CLINIC | Age: 29
End: 2024-12-09
Payer: COMMERCIAL

## 2024-12-09 VITALS
SYSTOLIC BLOOD PRESSURE: 120 MMHG | DIASTOLIC BLOOD PRESSURE: 52 MMHG | WEIGHT: 188.2 LBS | BODY MASS INDEX: 32.13 KG/M2 | HEIGHT: 64 IN

## 2024-12-09 DIAGNOSIS — Z01.419 ENCOUNTER FOR ANNUAL ROUTINE GYNECOLOGICAL EXAMINATION: Primary | ICD-10-CM

## 2024-12-09 PROCEDURE — 99395 PREV VISIT EST AGE 18-39: CPT | Performed by: OBSTETRICS & GYNECOLOGY

## 2024-12-09 RX ORDER — ETONOGESTREL 68 MG/1
68 IMPLANT SUBCUTANEOUS
COMMUNITY

## 2024-12-09 NOTE — PROGRESS NOTES
A:  Yearly exam.     P:     - Pap up to date. ASCCP guidelines for pap smear reviewed today.   - declines STI testing  - diet and exercise reviewed  - gardasil reviewed, will let us know if she decides to receive series if she has not already received    RTO in 1 year for annual exam or sooner as needed.     CC:  Yearly exam    S:  29 y.o. female here for yearly exam.   Yearly exam after delivery.  on 10/1/24  Pt is doing well  Had first period in Nov- has nexplanon in place  She is breastfeeding.   Pregnancy with gHTN- normotensive today   EPDS 0    Menarche: 12 years old.     LMP: 24  Contraception: nexplanon  Last Pap: 3/21/24- NILM  Last Mammo: n/a  Last Colonoscopy: n/a    Non-smoker, social drinker  Denies drug use  Exercising- walking twice daily. Has balanced diet    Sexual activity: She is sexually active without pain, bleeding.  Noting some dryness- using lubricant    STD testing:  She does not want STD testing today.     Gardasil:  She is unsure if she had the Gardasil series.     Family hx of breast cancer: denies  Family hx of ovarian cancer: denies  Family hx of colon cancer: denies      Current Outpatient Medications:     acetaminophen (TYLENOL) 325 mg tablet, Take 2 tablets (650 mg total) by mouth every 6 (six) hours as needed for mild pain or moderate pain, Disp: 30 tablet, Rfl: 0    etonogestrel (Nexplanon) subdermal implant, 68 mg by Subdermal route Once every 3 years, Disp: , Rfl:     Ferrous Sulfate (Iron) 28 MG TABS, Take by mouth SUPPLEMENT HAS VIT C AND B12, Disp: , Rfl:     Prenatal Vit-Fe Sulfate-FA (PRENATAL MULTIVIT-IRON PO), Take by mouth, Disp: , Rfl:     Current Facility-Administered Medications:     etonogestrel (NEXPLANON) subdermal implant 68 mg, 68 mg, Subdermal, Once every 3 years, , 68 mg at 24 1451  Social History     Socioeconomic History    Marital status: Single     Spouse name: Not on file    Number of children: Not on file    Years of education: Not on  file    Highest education level: Not on file   Occupational History    Not on file   Tobacco Use    Smoking status: Never     Passive exposure: Never    Smokeless tobacco: Never   Vaping Use    Vaping status: Never Used   Substance and Sexual Activity    Alcohol use: Not Currently    Drug use: Never     Comment: denies self, FOB denies, FOB's mother- etoh abuse    Sexual activity: Yes     Partners: Male     Comment: Gladys   Other Topics Concern    Not on file   Social History Narrative    Not on file     Social Drivers of Health     Financial Resource Strain: Not on file   Food Insecurity: No Food Insecurity (9/30/2024)    Nursing - Inadequate Food Risk Classification     Worried About Running Out of Food in the Last Year: Never true     Ran Out of Food in the Last Year: Never true     Ran Out of Food in the Last Year: Not on file   Transportation Needs: No Transportation Needs (9/30/2024)    PRAPARE - Transportation     Lack of Transportation (Medical): No     Lack of Transportation (Non-Medical): No   Physical Activity: Not on file   Stress: Not on file   Social Connections: Not on file   Intimate Partner Violence: Not on file   Housing Stability: Low Risk  (9/30/2024)    Housing Stability Vital Sign     Unable to Pay for Housing in the Last Year: No     Number of Times Moved in the Last Year: 0     Homeless in the Last Year: No     Family History   Problem Relation Age of Onset    No Known Problems Mother     Hypertension Father     Stroke Father     No Known Problems Maternal Grandmother     No Known Problems Maternal Grandfather     No Known Problems Paternal Grandmother     No Known Problems Paternal Grandfather       Past Medical History:   Diagnosis Date    Migraine     hx of migraines    Urinary tract infection         Review of Systems   Respiratory: Negative.    Cardiovascular: Negative.    Gastrointestinal: Negative for constipation and diarrhea.   Genitourinary: Negative for difficulty urinating,  "pelvic pain, vaginal bleeding, vaginal discharge, itching or odor.    O:  Blood pressure 120/52, height 5' 3.5\" (1.613 m), weight 85.4 kg (188 lb 3.2 oz), last menstrual period 11/22/2024, currently breastfeeding.    Exam was chaperoned.   Patient appears well and is not in distress  Neck is supple without masses  Breasts are symmetrical without mass, tenderness, nipple discharge, skin changes or adenopathy.   Abdomen is soft and nontender without masses.   External genitals are normal without lesions or rashes.  Urethral meatus and urethra are normal  Bladder is normal to palpation  Vagina is normal without discharge or bleeding.   Cervix is normal without discharge or lesion.   Uterus is normal, mobile, nontender without palpable mass.  Adnexa are normal, nontender, without palpable mass.   Rectum without abnormality     "

## 2024-12-21 ENCOUNTER — TELEMEDICINE (OUTPATIENT)
Dept: OTHER | Facility: HOSPITAL | Age: 29
End: 2024-12-21
Payer: COMMERCIAL

## 2024-12-21 VITALS — TEMPERATURE: 98.5 F

## 2024-12-21 DIAGNOSIS — J06.9 VIRAL UPPER RESPIRATORY TRACT INFECTION: Primary | ICD-10-CM

## 2024-12-21 PROCEDURE — 99213 OFFICE O/P EST LOW 20 MIN: CPT | Performed by: PHYSICIAN ASSISTANT

## 2024-12-21 NOTE — ASSESSMENT & PLAN NOTE
On exam overall well-appearing, non toxic afebrile.   Continue supportive treatment.:Vitamin D3 2000 IU daily  Vitamin C 1000mg twice per day  Multivitamin daily  Some studies suggest that Zinc 12.5-15mg every 2 hours while awake x 5 days may shorten the duration cold symptoms by 1-2 days.   Increase fluids and rest.  Nasal saline spray; Afrin if severe congestion (do not use for more than 3 days)  Over the counter decongestant/cough suppressants  Tylenol/Ibuprofen for pain/fever  Salt water gargles and chloraseptic spray  Throat Coat Tea  Warm compresses over sinuses  Cool mist humidifier or vicks vaporizer  Follow up with PCP if symptoms do not improve or worsen  Discussed should she develop shortness of breath, chest pain or fevers to report to the ER.  We discussed to help avoid transmitting to her daughter I encouraged her to use good hand hygiene and avoid kissing.  We also discussed could wear a mask around the baby until symptoms resolved.  We discussed talking with the pharmacist regarding safe over-the-counter medications for breast-feeding.  If tests have been ordered our office will contact you with results if changes need to be made to the care plan discussed with you at the visit.  You can review your full results on [facility]  Follow up with PCP in 3-5 days.  Proceed to  ER if symptoms worsen.

## 2024-12-21 NOTE — PROGRESS NOTES
Virtual Regular Visit  Name: Chelsea Garcia      : 1995      MRN: [id number]  Encounter Provider: Yesenia Martínez PA-C  Encounter Date: 2024   Encounter department: VIRTUAL CARE       Verification of patient location:  Patient is located at Home in the following state in which I hold an active license [place] :  Assessment & Plan  Viral upper respiratory tract infection  On exam overall well-appearing, non toxic afebrile.   Continue supportive treatment.:Vitamin D3 2000 IU daily  Vitamin C 1000mg twice per day  Multivitamin daily  Some studies suggest that Zinc 12.5-15mg every 2 hours while awake x 5 days may shorten the duration cold symptoms by 1-2 days.   Increase fluids and rest.  Nasal saline spray; Afrin if severe congestion (do not use for more than 3 days)  Over the counter decongestant/cough suppressants  Tylenol/Ibuprofen for pain/fever  Salt water gargles and chloraseptic spray  Throat Coat Tea  Warm compresses over sinuses  Cool mist humidifier or vicks vaporizer  Follow up with PCP if symptoms do not improve or worsen  Discussed should she develop shortness of breath, chest pain or fevers to report to the ER.  We discussed to help avoid transmitting to her daughter I encouraged her to use good hand hygiene and avoid kissing.  We also discussed could wear a mask around the baby until symptoms resolved.  We discussed talking with the pharmacist regarding safe over-the-counter medications for breast-feeding.  If tests have been ordered our office will contact you with results if changes need to be made to the care plan discussed with you at the visit.  You can review your full results on [facility] My[facility]  Follow up with PCP in 3-5 days.  Proceed to  ER if symptoms worsen.               Encounter provider Yesenia Martínez PA-C    The patient was identified by name and date of birth. Chelsea Garcia was informed that this is a telemedicine visit and that the visit is being conducted  through the Epic Embedded platform. She agrees to proceed..  My office door was closed. No one else was in the room.  She acknowledged consent and understanding of privacy and security of the video platform. The patient has agreed to participate and understands they can discontinue the visit at any time.    Patient is aware this is a billable service.     History of Present Illness     This is a 29 year old female here c/o sneezing more frequently, sore throat, nasal congestion since yesterday. Still able to swallow and eat and notes the throat is just achy.   Denies fevers, vomiting, diarrhea, cough, shortness of breath or chest pain.   Patient delivered her daughter 11 weeks ago and is concerned about getting her sick. Currently breastfeeding  Took theraflu pm for her symptoms.       Review of Systems   Constitutional:  Negative for fever.   HENT:  Positive for congestion, sneezing and sore throat. Negative for ear pain.    Respiratory:  Negative for cough and shortness of breath.    Cardiovascular:  Negative for chest pain.   Gastrointestinal:  Negative for diarrhea and vomiting.       Objective   Temp 98.5 °F (36.9 °C)   LMP 11/22/2024 (Exact Date)     Physical Exam  Constitutional:       General: She is not in acute distress.     Appearance: Normal appearance. She is not ill-appearing, toxic-appearing or diaphoretic.   HENT:      Nose: Congestion present.      Mouth/Throat:      Mouth: Mucous membranes are moist.      Pharynx: No posterior oropharyngeal erythema.   Eyes:      Conjunctiva/sclera: Conjunctivae normal.   Pulmonary:      Effort: Pulmonary effort is normal.      Comments: Patient able to speak in multiple full sentences without needing to break or pause.  Skin:     Comments: No rashes noted on face or neck.   Neurological:      General: No focal deficit present.      Mental Status: She is alert and oriented to person, place, and time.   Psychiatric:         Mood and Affect: Mood normal.          Behavior: Behavior normal.         Visit Time  Total Visit Duration: 9 min

## 2025-01-03 ENCOUNTER — NURSE TRIAGE (OUTPATIENT)
Dept: OTHER | Facility: OTHER | Age: 30
End: 2025-01-03

## 2025-01-03 ENCOUNTER — OFFICE VISIT (OUTPATIENT)
Dept: INTERNAL MEDICINE CLINIC | Facility: CLINIC | Age: 30
End: 2025-01-03
Payer: COMMERCIAL

## 2025-01-03 VITALS
SYSTOLIC BLOOD PRESSURE: 122 MMHG | BODY MASS INDEX: 33.13 KG/M2 | OXYGEN SATURATION: 99 % | WEIGHT: 190 LBS | HEART RATE: 92 BPM | DIASTOLIC BLOOD PRESSURE: 78 MMHG

## 2025-01-03 DIAGNOSIS — J04.0 ACUTE LARYNGITIS: ICD-10-CM

## 2025-01-03 DIAGNOSIS — J06.9 ACUTE URI: Primary | ICD-10-CM

## 2025-01-03 LAB — S PYO AG THROAT QL: NEGATIVE

## 2025-01-03 PROCEDURE — 99213 OFFICE O/P EST LOW 20 MIN: CPT | Performed by: INTERNAL MEDICINE

## 2025-01-03 PROCEDURE — 87880 STREP A ASSAY W/OPTIC: CPT | Performed by: INTERNAL MEDICINE

## 2025-01-03 NOTE — PATIENT INSTRUCTIONS
-Take over-the-counter Mucinex DM as needed for cough and congestion  -For runny nose and sinus congestion you may use over-the-counter saline nasal spray.  -For throat pain and discomfort you may take Tylenol 1000 mg 3 times a day as needed.  You may also take over-the-counter cough drops as needed.  -Perform warm salt water gargles 1-2 times a day.  -Try to rest your voice is much as possible.  -Your strep test is negative

## 2025-01-03 NOTE — TELEPHONE ENCOUNTER
"Reason for Disposition   SEVERE (e.g., excruciating) throat pain    Answer Assessment - Initial Assessment Questions  1. ONSET: \"When did the throat start hurting?\" (Hours or days ago)       Several days ago    2. SEVERITY: \"How bad is the sore throat?\" (Scale 1-10; mild, moderate or severe)      Getting worse      4.  VIRAL SYMPTOMS: \"Are there any symptoms of a cold, such as a runny nose, cough, hoarse voice or red eyes?\"       Coughing up phlegm   Lost voice   5. FEVER: \"Do you have a fever?\" If Yes, ask: \"What is your temperature, how was it measured, and when did it start?\"      Denies      Patient has an appointment for Wednesday, inquiring if she can be seen today instead as she is not feeling well. No appointment available until Monday. Patient advised to go to urgent care in the morning.    Protocols used: Sore Throat-Adult-    "

## 2025-01-03 NOTE — LETTER
January 3, 2025     Patient: Chelsea Garcia  YOB: 1995  Date of Visit: 1/3/2025      To Whom it May Concern:    Chelsea Garcia is under my professional care. Chelsea was seen in my office on 1/3/2025. Chelsea may return to work on 1/6/25 .    If you have any questions or concerns, please don't hesitate to call.         Sincerely,          Marck Lawton MD

## 2025-01-03 NOTE — PROGRESS NOTES
Name: Chelsea Gracia      : 1995      MRN: [id number]  Encounter Provider: Marck Lawton MD  Encounter Date: 1/3/2025   [facility]: [facility]  :  Assessment & Plan  Acute URI  -Rapid strep test negative   -Take over-the-counter Mucinex DM as needed for cough and congestion  -For runny nose and sinus congestion use over-the-counter saline nasal spray.  -For throat pain and discomfort take Tylenol 1000 mg 3 times a day as needed.  Patient may also take over-the-counter cough drops as needed.  -Perform warm salt water gargles 1-2 times a day.  -Try to rest voice is much as possible.       Acute laryngitis  See above   Orders:  •  POCT rapid ANTIGEN strepA           History of Present Illness     HPI  Patient presents today as an acute visit.  She complains of cough and cold symptoms over the past 2 days.  She reports her baby was recently diagnosed with croup.  She endorses cough productive of mucus, postnasal drip, sore throat and hoarseness.  She denies any fevers or chills.  For symptom management she has been taking TheraFlu.    Review of Systems  All other systems negative except for pertinent findings noted in HPI.     Objective   /78   Pulse 92   Wt 86.2 kg (190 lb)   LMP 2024 (Exact Date)   SpO2 99%   BMI 33.13 kg/m²      Physical Exam  General: NAD  HEENT: NCAT, EOMI, normal conjunctiva, mild posterior pharyngeal erythema  Cardiovascular: RRR, normal S1 and S2, no m/r/g  Pulmonary: Normal respiratory effort, no wheezes, rales or rhonchi  Extremities: No lower extremity edema  Skin: Normal skin color, no rashes   Psychiatric: Normal mood and affect

## 2025-01-20 PROBLEM — J06.9 VIRAL UPPER RESPIRATORY TRACT INFECTION: Status: RESOLVED | Noted: 2024-12-21 | Resolved: 2025-01-20

## 2025-04-10 ENCOUNTER — OFFICE VISIT (OUTPATIENT)
Dept: INTERNAL MEDICINE CLINIC | Facility: CLINIC | Age: 30
End: 2025-04-10
Payer: COMMERCIAL

## 2025-04-10 VITALS
OXYGEN SATURATION: 98 % | HEART RATE: 95 BPM | SYSTOLIC BLOOD PRESSURE: 112 MMHG | HEIGHT: 63 IN | BODY MASS INDEX: 31.75 KG/M2 | WEIGHT: 179.2 LBS | DIASTOLIC BLOOD PRESSURE: 70 MMHG

## 2025-04-10 DIAGNOSIS — Z00.00 ANNUAL PHYSICAL EXAM: Primary | ICD-10-CM

## 2025-04-10 DIAGNOSIS — G89.29 CHRONIC PAIN OF LEFT KNEE: ICD-10-CM

## 2025-04-10 DIAGNOSIS — M25.562 CHRONIC PAIN OF LEFT KNEE: ICD-10-CM

## 2025-04-10 PROCEDURE — 99395 PREV VISIT EST AGE 18-39: CPT | Performed by: INTERNAL MEDICINE

## 2025-04-10 PROCEDURE — 99213 OFFICE O/P EST LOW 20 MIN: CPT | Performed by: INTERNAL MEDICINE

## 2025-04-10 NOTE — PROGRESS NOTES
Adult Annual Physical  Name: Chelsea Garcia      : 1995      MRN: [id number]  Encounter Provider: Marck Lawton MD  Encounter Date: 4/10/2025   Enc[facility] department: [facility] [place]    :  Assessment & Plan  Annual physical exam  - Overall patient appears to be doing well.  Encouraged her to continue with the dietary changes she has made incorporating more fruits and vegetables.  Continue current walking regimen of two 60-minute walks a week.       Chronic pain of left knee  - Chronic knee pain secondary to remote soccer injury.  Patient has popping and locking of the knee in addition to patellar laxity.  Recommended obtaining a plain film x-ray of the knee in addition to referral to orthopedic surgery for further evaluation.  Orders:  •  XR knee 4+ vw left injury; Future  •  Ambulatory Referral to Orthopedic Surgery; Future    Annual physical exam         Preventive Screenings:    - Cervical cancer screening: screening up-to-date     Immunizations:  - Immunizations due: Influenza         History of Present Illness     Adult Annual Physical  Patient presents today for an annual physical.  She states she is currently breast-feeding her 6-month-old daughter.  She reports she has been trying to eat healthier by increasing her veggie and fruit intake.  Over the past month she has made an effort to go for a 60-minute walk twice a week with her dog.    Her only other complaint today is chronic left knee pain which she states is an old soccer injury.  While playing she states she got cleated by another player on the outer portion of her left knee.  She was told initially that she likely had an ACL and meniscus injury.  She reports it was treated conservatively with bracing.  She states ever since then she has issues with her knee locking and popping at times.  She feels her left patella is much more mobile side-to-side than her right patella.    Review of Systems  All other systems  "negative except for pertinent findings noted in HPI.     Past Medical History   Past Medical History:   Diagnosis Date   • Allergic     Seasonal   • Migraine     hx of migraines   • Urinary tract infection      History reviewed. No pertinent surgical history.  Family History   Problem Relation Age of Onset   • No Known Problems Mother    • Hypertension Father    • Stroke Father    • No Known Problems Maternal Grandmother    • No Known Problems Maternal Grandfather    • No Known Problems Paternal Grandmother    • No Known Problems Paternal Grandfather       reports that she has never smoked. She has never been exposed to tobacco smoke. She has never used smokeless tobacco. She reports that she does not currently use alcohol. She reports that she does not use drugs.  Current Outpatient Medications   Medication Instructions   • acetaminophen (TYLENOL) 650 mg, Oral, Every 6 hours PRN   • BIOTIN PO Take by mouth   • Ferrous Sulfate (Iron) 28 MG TABS Take by mouth SUPPLEMENT HAS VIT C AND B12   • Nexplanon 68 mg, Once every 3 years -  Implant and IUD   • Prenatal Vit-Fe Sulfate-FA (PRENATAL MULTIVIT-IRON PO) Take by mouth     Allergies   Allergen Reactions   • Latex Rash   • Plum Pulp - Food Allergy Rash        Objective   /70 (BP Location: Left arm, Patient Position: Sitting, Cuff Size: Standard)   Pulse 95   Ht 5' 3\" (1.6 m)   Wt 81.3 kg (179 lb 3.2 oz)   SpO2 98%   BMI 31.74 kg/m²     Physical Exam  General: NAD  HEENT: NCAT, EOMI, normal conjunctiva  Cardiovascular: RRR, normal S1 and S2, no m/r/g  Pulmonary: Normal respiratory effort, no wheezes, rales or rhonchi  GI: Soft, nontender, nondistended, normoactive bowel sounds  Musculoskeletal: Normal bulk and tone, tenderness to palpation over region of left lateral LCL, positive apprehension on left, left medial/lateral patellar laxity  Neuro: Non-focal, ambulating without difficulty, non-antalgic gait  Extremities: No lower extremity edema  Skin: Normal " skin color, no rashes   Psychiatric: Normal mood and affect

## 2025-04-10 NOTE — PATIENT INSTRUCTIONS
"Patient Education     Routine physical for adults   The Basics   Written by the doctors and editors at [facility]   What is a physical? -- A physical is a routine visit, or \"check-up,\" with your doctor. You might also hear it called a \"wellness visit\" or \"preventive visit.\"  During each visit, the doctor will:   Ask about your physical and mental health   Ask about your habits, behaviors, and lifestyle   Do an exam   Give you vaccines if needed   Talk to you about any medicines you take   Give advice about your health   Answer your questions  Getting regular check-ups is an important part of taking care of your health. It can help your doctor find and treat any problems you have. But it's also important for preventing health problems.  A routine physical is different from a \"sick visit.\" A sick visit is when you see a doctor because of a health concern or problem. Since physicals are scheduled ahead of time, you can think about what you want to ask the doctor.  How often should I get a physical? -- It depends on your age and health. In general, for people age 21 years and older:   If you are younger than 50 years, you might be able to get a physical every 3 years.   If you are 50 years or older, your doctor might recommend a physical every year.  If you have an ongoing health condition, like diabetes or high blood pressure, your doctor will probably want to see you more often.  What happens during a physical? -- In general, each visit will include:   Physical exam - The doctor or nurse will check your height, weight, heart rate, and blood pressure. They will also look at your eyes and ears. They will ask about how you are feeling and whether you have any symptoms that bother you.   Medicines - It's a good idea to bring a list of all the medicines you take to each doctor visit. Your doctor will talk to you about your medicines and answer any questions. Tell them if you are having any side effects that bother you. You " "should also tell them if you are having trouble paying for any of your medicines.   Habits and behaviors - This includes:   Your diet   Your exercise habits   Whether you smoke, drink alcohol, or use drugs   Whether you are sexually active   Whether you feel safe at home  Your doctor will talk to you about things you can do to improve your health and lower your risk of health problems. They will also offer help and support. For example, if you want to quit smoking, they can give you advice and might prescribe medicines. If you want to improve your diet or get more physical activity, they can help you with this, too.   Lab tests, if needed - The tests you get will depend on your age and situation. For example, your doctor might want to check your:   Cholesterol   Blood sugar   Iron level   Vaccines - The recommended vaccines will depend on your age, health, and what vaccines you already had. Vaccines are very important because they can prevent certain serious or deadly infections.   Discussion of screening - \"Screening\" means checking for diseases or other health problems before they cause symptoms. Your doctor can recommend screening based on your age, risk, and preferences. This might include tests to check for:   Cancer, such as breast, prostate, cervical, ovarian, colorectal, prostate, lung, or skin cancer   Sexually transmitted infections, such as chlamydia and gonorrhea   Mental health conditions like depression and anxiety  Your doctor will talk to you about the different types of screening tests. They can help you decide which screenings to have. They can also explain what the results might mean.   Answering questions - The physical is a good time to ask the doctor or nurse questions about your health. If needed, they can refer you to other doctors or specialists, too.  Adults older than 65 years often need other care, too. As you get older, your doctor will talk to you about:   How to prevent falling at " home   Hearing or vision tests   Memory testing   How to take your medicines safely   Making sure that you have the help and support you need at home  All topics are updated as new evidence becomes available and our peer review process is complete.  This topic retrieved from [employer] on: May 02, 2024.  Topic 698923 Version 1.0  Release: 32.4.3 - C32.122  © 2024 UpToDate, Inc. and/or its affiliates. All rights reserved.  Consumer Information Use and Disclaimer   Disclaimer: This generalized information is a limited summary of diagnosis, treatment, and/or medication information. It is not meant to be comprehensive and should be used as a tool to help the user understand and/or assess potential diagnostic and treatment options. It does NOT include all information about conditions, treatments, medications, side effects, or risks that may apply to a specific patient. It is not intended to be medical advice or a substitute for the medical advice, diagnosis, or treatment of a health care provider based on the health care provider's examination and assessment of a patient's specific and unique circumstances. Patients must speak with a health care provider for complete information about their health, medical questions, and treatment options, including any risks or benefits regarding use of medications. This information does not endorse any treatments or medications as safe, effective, or approved for treating a specific patient. UpToDate, Inc. and its affiliates disclaim any warranty or liability relating to this information or the use thereof.The use of this information is governed by the Terms of Use, available at https://www.woltersMassHousinguwer.com/en/know/clinical-effectiveness-terms. 2024© UpToDate, Inc. and its affiliates and/or licensors. All rights reserved.  Copyright   © 2024 UpToDate, Inc. and/or its affiliates. All rights reserved.

## 2025-04-10 NOTE — ASSESSMENT & PLAN NOTE
- Chronic knee pain secondary to remote soccer injury.  Patient has popping and locking of the knee in addition to patellar laxity.  Recommended obtaining a plain film x-ray of the knee in addition to referral to orthopedic surgery for further evaluation.  Orders:  •  XR knee 4+ vw left injury; Future  •  Ambulatory Referral to Orthopedic Surgery; Future

## 2025-05-05 ENCOUNTER — APPOINTMENT (OUTPATIENT)
Dept: RADIOLOGY | Facility: AMBULARY SURGERY CENTER | Age: 30
End: 2025-05-05
Attending: ORTHOPAEDIC SURGERY
Payer: COMMERCIAL

## 2025-05-05 ENCOUNTER — OFFICE VISIT (OUTPATIENT)
Dept: OBGYN CLINIC | Facility: CLINIC | Age: 30
End: 2025-05-05
Payer: COMMERCIAL

## 2025-05-05 VITALS — WEIGHT: 179 LBS | BODY MASS INDEX: 31.71 KG/M2 | HEIGHT: 63 IN

## 2025-05-05 DIAGNOSIS — M25.562 LEFT KNEE PAIN, UNSPECIFIED CHRONICITY: Primary | ICD-10-CM

## 2025-05-05 DIAGNOSIS — S89.92XA INJURY OF POSTEROLATERAL CORNER OF KNEE, LEFT, INITIAL ENCOUNTER: ICD-10-CM

## 2025-05-05 DIAGNOSIS — M25.562 LEFT KNEE PAIN, UNSPECIFIED CHRONICITY: ICD-10-CM

## 2025-05-05 DIAGNOSIS — M22.2X2 PATELLOFEMORAL DISORDER OF LEFT KNEE: ICD-10-CM

## 2025-05-05 PROCEDURE — 73562 X-RAY EXAM OF KNEE 3: CPT

## 2025-05-05 PROCEDURE — 99204 OFFICE O/P NEW MOD 45 MIN: CPT | Performed by: ORTHOPAEDIC SURGERY

## 2025-05-05 NOTE — PROGRESS NOTES
Name: Chelsea Garcia      : 1995      MRN: [id number]  Encounter Provider: Andrea Palacios DO  Encounter Date: 2025   Encounter department: [facility]  :  Assessment & Plan  Left knee pain, unspecified chronicity    Orders:    XR knee 3 vw left non injury; Future    MRI knee left wo contrast; Future    Injury of posterolateral corner of knee, left, initial encounter    Orders:    MRI knee left wo contrast; Future    Patellofemoral disorder of left knee    Orders:    MRI knee left wo contrast; Future            Plan:  Chelsea Garcia is a 30 y.o. female with possible previous posterolateral corner injury, patellar subluxation episodes  Physical exam, diagnostic imaging, and subjective history are all most consistent with the above diagnosis. The pathoanatomy and natural history of this diagnosis was explained to the patient in detail.   X-ray obtained today and reviewed with the patient demonstrating no acute osseous abnormalities  Discussed that this is likely secondary to her previous injury in high school, and is most consistent with posterolateral corner injury. She may also have meniscal tear.   Order placed today for MRI of the left knee for further evaluation   Apply Voltaren gel to painful area up to 4 times a day  May use ice or heat as needed for pain relief  May take NSAIDs/Tylenol as needed for pain control   Follow up after MRI to review results    Subjective:    Patient ID:  Chelsea Garcia 30 y.o. female    Chelsea Garcia is a 30 y.o. female who presents today for evaluation and treatment of left knee pain ongoing since high school soccer. She had an incident where she took a slide tackle on the anterolateral side of her knee with swelling, treated with TROM, rest, ice. She was told at the time that she maybe had a meniscus tear, bursitis. She states it locks up, has episodes of 10/10 pain that last 10 minutes. She states her knee goes out of  place and her fiancee puts it back in. Pain resolves after this type of episode with OTC medication, rest, heat and ice.       The following portions of the patient's history were reviewed and updated as appropriate: allergies, current medications, past family history, past social history, past surgical history and problem list.      Social History     Socioeconomic History    Marital status: Single     Spouse name: Not on file    Number of children: Not on file    Years of education: Not on file    Highest education level: Not on file   Occupational History    Not on file   Tobacco Use    Smoking status: Never     Passive exposure: Never    Smokeless tobacco: Never   Vaping Use    Vaping status: Never Used   Substance and Sexual Activity    Alcohol use: Not Currently    Drug use: Never     Comment: denies self, FOB denies, FOB's mother- etoh abuse    Sexual activity: Yes     Partners: Male     Birth control/protection: None     Comment: Gladys   Other Topics Concern    Not on file   Social History Narrative    Not on file     Social Drivers of Health     Financial Resource Strain: Not on file   Food Insecurity: Food Insecurity Present (4/10/2025)    Hunger Vital Sign     Worried About Running Out of Food in the Last Year: Sometimes true     Ran Out of Food in the Last Year: Never true   Transportation Needs: No Transportation Needs (4/10/2025)    PRAPARE - Transportation     Lack of Transportation (Medical): No     Lack of Transportation (Non-Medical): No   Physical Activity: Not on file   Stress: Not on file   Social Connections: Not on file   Intimate Partner Violence: Not on file   Housing Stability: Low Risk  (4/10/2025)    Housing Stability Vital Sign     Unable to Pay for Housing in the Last Year: No     Number of Times Moved in the Last Year: 0     Homeless in the Last Year: No     Past Medical History:   Diagnosis Date    Allergic     Seasonal    Migraine     hx of migraines    Urinary tract infection   "    History reviewed. No pertinent surgical history.  Allergies   Allergen Reactions    Latex Rash    Plum Pulp - Food Allergy Rash     Current Outpatient Medications on File Prior to Visit   Medication Sig Dispense Refill    acetaminophen (TYLENOL) 325 mg tablet Take 2 tablets (650 mg total) by mouth every 6 (six) hours as needed for mild pain or moderate pain 30 tablet 0    BIOTIN PO Take by mouth      etonogestrel (Nexplanon) subdermal implant 68 mg by Subdermal route Once every 3 years      Ferrous Sulfate (Iron) 28 MG TABS Take by mouth SUPPLEMENT HAS VIT C AND B12      Prenatal Vit-Fe Sulfate-FA (PRENATAL MULTIVIT-IRON PO) Take by mouth       Current Facility-Administered Medications on File Prior to Visit   Medication Dose Route Frequency Provider Last Rate Last Admin    etonogestrel (NEXPLANON) subdermal implant 68 mg  68 mg Subdermal Once every 3 years    68 mg at 11/11/24 9329            Objective:    Review of Systems  Pertinent items are noted in HPI.  All other systems were reviewed and are negative.    Physical Exam  Cons: Appears well.  No apparent distress.  Psych: Alert. Oriented x3.  Mood and affect normal.  Eyes: PERRLA, EOMI  Resp: Normal effort.  No audible wheezing or stridor.  CV: Palpable pulse.  No discernable arrhythmia.  No LE edema.  Lymph:  No palpable cervical, axillary, or inguinal lymphadenopathy.  Skin: Warm.  No palpable masses.  No visible lesions.  Neuro: Normal muscle tone.  Normal and symmetric DTR's.    BMI:   Estimated body mass index is 31.71 kg/m² as calculated from the following:    Height as of this encounter: 5' 3\" (1.6 m).    Weight as of this encounter: 81.2 kg (179 lb).  No results found for: \"HGBA1C\"      Left Knee Exam     Range of Motion   Extension:  5 (hyper extension causes anterior joint line pain)     Tests   Disha:  Medial - negative Lateral - negative  Varus: negative Valgus: negative (palpable pop without pain)  Drawer:  Left knee anterior drawer test: " "increased rotational motion.       Patellar apprehension: positive    Other   Erythema: absent  Scars: absent  Sensation: normal  Pulse: present  Swelling: none  Effusion: no effusion present    Comments:    Posterolateral corner at 30 and 60 degrees increased pain  Posterolateral corner at 30 increased range of motion  - Thessaly  TTP lateral patellar facet  No palpable MPFL  Mild medial joint line tenderness  Mild lateral thrust with ambulation            Procedures  Procedures  No Procedures performed today    Diagnostics, reviewed and taken today if performed as documented:  I have personally reviewed pertinent films in PACS and my interpretation is no acute osseous abnormalities.        Scribe Attestation      I,:  Akosua Ashby am acting as a scribe while in the presence of the attending physician.:       I,:  Andrea Palacios, DO personally performed the services described in this documentation    as scribed in my presence.:             Portions of the record may have been created with voice recognition software.  Occasional wrong word or \"sound a like\" substitutions may have occurred due to the inherent limitations of voice recognition software.  Read the chart carefully and recognize, using context, where substitutions have occurred.  "

## 2025-05-21 ENCOUNTER — HOSPITAL ENCOUNTER (OUTPATIENT)
Dept: MRI IMAGING | Facility: HOSPITAL | Age: 30
Discharge: HOME | End: 2025-05-21
Attending: ORTHOPAEDIC SURGERY
Payer: COMMERCIAL

## 2025-05-21 DIAGNOSIS — M22.2X2 PATELLOFEMORAL DISORDER OF LEFT KNEE: ICD-10-CM

## 2025-05-21 DIAGNOSIS — S89.92XA INJURY OF POSTEROLATERAL CORNER OF KNEE, LEFT, INITIAL ENCOUNTER: ICD-10-CM

## 2025-05-21 DIAGNOSIS — M25.562 LEFT KNEE PAIN, UNSPECIFIED CHRONICITY: ICD-10-CM

## 2025-05-21 PROCEDURE — 73721 MRI JNT OF LWR EXTRE W/O DYE: CPT

## 2025-06-02 ENCOUNTER — OFFICE VISIT (OUTPATIENT)
Dept: OBGYN CLINIC | Facility: CLINIC | Age: 30
End: 2025-06-02
Payer: COMMERCIAL

## 2025-06-02 VITALS — WEIGHT: 179 LBS | HEIGHT: 63 IN | BODY MASS INDEX: 31.71 KG/M2

## 2025-06-02 DIAGNOSIS — M22.2X2 PATELLOFEMORAL DISORDER OF LEFT KNEE: Primary | ICD-10-CM

## 2025-06-02 PROCEDURE — 99212 OFFICE O/P EST SF 10 MIN: CPT | Performed by: ORTHOPAEDIC SURGERY

## 2025-06-02 NOTE — PROGRESS NOTES
Name: Chelsea Garcia      : 1995      MRN: [id number]  Encounter Provider: Andrea Palacios DO  Encounter Date: 2025   Encounter department: [facility]      Assessment: Chelsea Garcia is a 30 y.o. female with left knee patellofemoral disorder    :  Assessment & Plan  Patellofemoral disorder of left knee    Orders:    Ambulatory referral to Physical Therapy; Future  Physical exam, diagnostic imaging, and subjective history are all most consistent with the above diagnosis. The pathoanatomy and natural history of this diagnosis was explained to the patient in detail.   Order placed today for patient to initiate outpatient Physical Therapy  Recommend Louis taping to aid in improving patellofemoral tracking. Instructional/informational handout provided today. PT may aid in assisting with application.          Subjective:  Chelsea Garcia is a 30 y.o. female who presents today to review MRI of the left knee. Patient states that her pain has improved since the last visit.        The following portions of the patient's history were reviewed and updated as appropriate: allergies, current medications, past family history, past social history, past surgical history and problem list.      Social History     Socioeconomic History    Marital status: Single     Spouse name: Not on file    Number of children: Not on file    Years of education: Not on file    Highest education level: Not on file   Occupational History    Not on file   Tobacco Use    Smoking status: Never     Passive exposure: Never    Smokeless tobacco: Never   Vaping Use    Vaping status: Never Used   Substance and Sexual Activity    Alcohol use: Not Currently    Drug use: Never     Comment: denies self, FOB denies, FOB's mother- etoh abuse    Sexual activity: Yes     Partners: Male     Birth control/protection: None     Comment: Dino-FOB   Other Topics Concern    Not on file   Social History Narrative     "Not on file     Social Drivers of Health     Financial Resource Strain: Not on file   Food Insecurity: Food Insecurity Present (4/10/2025)    Nursing - Inadequate Food Risk Classification     Worried About Running Out of Food in the Last Year: Sometimes true     Ran Out of Food in the Last Year: Never true     Ran Out of Food in the Last Year: Not on file   Transportation Needs: No Transportation Needs (4/10/2025)    PRAPARE - Transportation     Lack of Transportation (Medical): No     Lack of Transportation (Non-Medical): No   Physical Activity: Not on file   Stress: Not on file   Social Connections: Not on file   Intimate Partner Violence: Not on file   Housing Stability: Low Risk  (4/10/2025)    Housing Stability Vital Sign     Unable to Pay for Housing in the Last Year: No     Number of Times Moved in the Last Year: 0     Homeless in the Last Year: No     Past Medical History[1]  Past Surgical History[2]  Allergies[3]  Medications Ordered Prior to Encounter[4]         Objective:    Review of Systems  Pertinent items are noted in HPI.  All other systems were reviewed and are negative.    Physical Exam  Cons: Appears well.  No apparent distress.  Psych: Alert. Oriented x3.  Mood and affect normal.  Eyes: PERRLA, EOMI  Resp: Normal effort.  No audible wheezing or stridor.  CV: Palpable pulse.  No discernable arrhythmia.  No LE edema.  Lymph:  No palpable cervical, axillary, or inguinal lymphadenopathy.  Skin: Warm.  No palpable masses.  No visible lesions.  Neuro: Normal muscle tone.  Normal and symmetric DTR's.    BMI:   Estimated body mass index is 31.71 kg/m² as calculated from the following:    Height as of this encounter: 5' 3\" (1.6 m).    Weight as of this encounter: 81.2 kg (179 lb).  No results found for: \"HGBA1C\"      Left Knee Exam   Left knee exam is normal.    Range of Motion   The patient has normal left knee ROM.    Other   Swelling: none  Effusion: no effusion " "present            Procedures  Procedures  No Procedures performed today    Diagnostics, reviewed and taken today if performed as documented:  I have personally reviewed pertinent films in PACS.  MRI left knee - no acute abnormality.  Medial femoral condyle enchondroma noted, patient aware.          Portions of the record may have been created with voice recognition software.  Occasional wrong word or \"sound a like\" substitutions may have occurred due to the inherent limitations of voice recognition software.  Read the chart carefully and recognize, using context, where substitutions have occurred.        [1]   Past Medical History:  Diagnosis Date    Allergic     Seasonal    Migraine     hx of migraines    Urinary tract infection    [2] No past surgical history on file.  [3]   Allergies  Allergen Reactions    Latex Rash    Plum Pulp - Food Allergy Rash   [4]   Current Outpatient Medications on File Prior to Visit   Medication Sig Dispense Refill    acetaminophen (TYLENOL) 325 mg tablet Take 2 tablets (650 mg total) by mouth every 6 (six) hours as needed for mild pain or moderate pain 30 tablet 0    BIOTIN PO Take by mouth      etonogestrel (Nexplanon) subdermal implant 68 mg by Subdermal route Once every 3 years      Ferrous Sulfate (Iron) 28 MG TABS Take by mouth SUPPLEMENT HAS VIT C AND B12      Prenatal Vit-Fe Sulfate-FA (PRENATAL MULTIVIT-IRON PO) Take by mouth       Current Facility-Administered Medications on File Prior to Visit   Medication Dose Route Frequency Provider Last Rate Last Admin    etonogestrel (NEXPLANON) subdermal implant 68 mg  68 mg Subdermal Once every 3 years    68 mg at 11/11/24 1459     "

## 2025-06-02 NOTE — ASSESSMENT & PLAN NOTE
Orders:    Ambulatory referral to Physical Therapy; Future  Physical exam, diagnostic imaging, and subjective history are all most consistent with the above diagnosis. The pathoanatomy and natural history of this diagnosis was explained to the patient in detail.   Order placed today for patient to initiate outpatient Physical Therapy  Recommend Louis taping to aid in improving patellofemoral tracking. Instructional/informational handout provided today. PT may aid in assisting with application.

## 2025-06-19 ENCOUNTER — EVALUATION (OUTPATIENT)
Dept: PHYSICAL THERAPY | Facility: CLINIC | Age: 30
End: 2025-06-19
Payer: COMMERCIAL

## 2025-06-19 DIAGNOSIS — M22.2X2 PATELLOFEMORAL DISORDER OF LEFT KNEE: Primary | ICD-10-CM

## 2025-06-19 PROCEDURE — 97161 PT EVAL LOW COMPLEX 20 MIN: CPT

## 2025-06-19 PROCEDURE — 97112 NEUROMUSCULAR REEDUCATION: CPT

## 2025-06-19 PROCEDURE — 97110 THERAPEUTIC EXERCISES: CPT

## 2025-06-19 NOTE — PROGRESS NOTES
PT Evaluation     Today's date: 2025  Patient name: Chelsea Garcia  : 1995  MRN: [id number]  Referring provider: Khushboo West*  Dx:   Encounter Diagnosis     ICD-10-CM    1. Patellofemoral disorder of left knee  M22.2X2           Start Time: 1600  Stop Time: 1645  Total time in clinic (min): 45 minutes    Assessment  Impairments: abnormal coordination, abnormal gait, activity intolerance, impaired balance, impaired physical strength, lacks appropriate home exercise program, pain with function, safety issue, poor posture  and poor body mechanics  Functional limitations: walking, stairs  Symptom irritability: low    Assessment details: Pt is a 30 year old female referred to skilled PT services with primary complaint of Lt knee pain. Pertinent PMH includes Iron deficiency, migraines, seasonal allergies. Pt presents at initial evaluation with above listed impairments. Notable findings include R hip weakness, poor patellar tracking, poor functional body mechanics. Sx are of low irritability at this point, and pt would likely benefit from advanced functional strength training program to prevent exacerbation of sx in the future. Based on objective and subjective measures, pt is a good candidate for skilled PT services. Pt will benefit from skilled PT services in order to return to PLOF.   Understanding of Dx/Px/POC: excellent     Prognosis: excellent    Goals  STG to be acheived within 6 weeks:   1. Pt will be independent and compliant with initial HEP for improvement of LE neuromuscular control and strength.   2. Pt will improve global hip MMTs by at least 1/2  level from eval in order to promote functional strength for walking and stair climbing .    LTG to be acheived within 12 weeks:   1. Pt will be independent and compliant with advanced HEP for progression and maintenance of gains made in therapy.   2. Pt will improve global hip MMTs by at least 1 full level from eval in order to promote  "functional strength for walking and stair climbing .    3. Pt will improve FOTO score to at least predicted value at evaluation to demonstrate subjective improvement in pt's ability to perform ADLs.         Plan  Patient would benefit from: skilled physical therapy  Planned modality interventions: cryotherapy and thermotherapy: hydrocollator packs    Planned therapy interventions: IASTM, joint mobilization, abdominal trunk stabilization, balance, balance/weight bearing training, manual therapy, massage, motor coordination training, neuromuscular re-education, therapeutic activities, therapeutic exercise, functional ROM exercises, gait training, home exercise program, flexibility, coordination, body mechanics training, stretching and strengthening    Frequency: 1-2x week  Duration in weeks: 12  Plan of Care beginning date: 2025  Plan of Care expiration date: 2025  Treatment plan discussed with: patient        Subjective Evaluation    History of Present Illness  Mechanism of injury: Pt has a \"very old\" soccer/gymnastics/kickboxing injury. She was hit in her Lt knee by a cleat. Since then, she states it has felt really unstable. In the past few months she noted that it was harder to walk. This past month, she notes that she has been doing some stretching and massage and it has been \"significantly better.\"  When pain was bad, she would get \"firework\" pain that felt like a shooting tingling. It has been about a year since this pain has occurred.     No hx of any knee surgeries but she did wear a hinged knee brace for 1 year.     Sleep: used to disrupt her sleep but does not anymore            Recurrent probem    Quality of life: good    Patient Goals  Patient goals for therapy: decreased pain    Pain  Current pain ratin  At best pain ratin  At worst pain ratin  Location: lateral knee joint and sometimes inferior patellofemoral  Quality: sharp and dull ache  Alleviating factors: painkillers, tens, " massage.  Exacerbated by: walking, weather, angling the knee valgus, stairs.  Progression: improved    Social Support  Lives in: apartment    Employment status: working (pharmaceutical work)  Exercise history: 1 hour walks 2x/week      Diagnostic Tests  MRI studies: abnormal (no significant deformities, see imaging tab in chart for details)  Treatments  Previous treatment: medication and massage  Current treatment: physical therapy        Objective    Squat: valgus collapse of Lt side, excess anterior pelvic tilt     Patellar tracking: decreased medial tracking on Lt compared to R      AROM   - knee flexion R 121 Lt 120   - knee extension R 3 deg hyperextension Lt 5 deg hyperextension    MMT/STRENGTH   - hip flexion R 4+/5 5/5  - knee extension R 5/5 Lt 4/5   - knee flexion R 4/5 Lt 4/5   - hip abduction R 4/5 Lt 4-/5    Balance  - Single leg R 30/30 sec Lt 30/30          Precautions: none  CO-MORBIDITIES: Iron deficiency, migraines, seasonal allergies  FOTO Completed On: 6/19/25    POC Expires Reeval for Medicare to be completed Unit Limit Auth Expiration Date PT/OT/STVisit Limit   9/11/2025 By visit NA 4 per day N/A BOMN    Completed on visit                    TREATMENT DIARY  Auth Status DATE 6/19        NA Visit # 1         Remaining         MANUAL THERAPY         Missy rodríguez                                                       THERAPEUTIC EXERCISE HEP         Heel raises 2x15 4x/week 2x12         Side lying hip abduction 2x10 4x/week 2x5 ea side         Bridges  2x10 4x/week 2x10         Leg press          Single leg RDL                                                                                                              NEUROMUSCULAR REEDUCATION           Single leg balance  3x30 sec ea side  2x30 ea side         Single leg balance foam           Dead bugs          planks                                                                                                              THERAPEUTIC  ACTIVITY          Step ups           lunges                              GAIT TRAINING                                                  MODALITIES

## 2025-06-19 NOTE — LETTER
2025    Khushboo Parra MD  [address] [facility] [address]    Patient: Chelsea Garcia   YOB: 1995   Date of Visit: 2025     Encounter Diagnosis     ICD-10-CM    1. Patellofemoral disorder of left knee  M22.2X2           Dear Dr. Khushboo Parra MD:    Thank you for your recent referral of Chelsea Garcia. Please review the attached evaluation summary from Chelsea's recent visit.     Please verify that you agree with the plan of care by signing the attached order.     If you have any questions or concerns, please do not hesitate to call.     I sincerely appreciate the opportunity to share in the care of one of your patients and hope to have another opportunity to work with you in the near future.       Sincerely,    Marcelle Hatch, PT      Referring Provider:      I certify that I have read the below Plan of Care and certify the need for these services furnished under this plan of treatment while under my care.                    Khushboo Parra MD  [address] [facility] [address]  Via Fax: [phone]          PT Evaluation     Today's date: 2025  Patient name: Chelsea Garcia  : 1995  MRN: [id number]  Referring provider: Khushboo West*  Dx:   Encounter Diagnosis     ICD-10-CM    1. Patellofemoral disorder of left knee  M22.2X2           Start Time: 1600  Stop Time: 1645  Total time in clinic (min): 45 minutes    Assessment  Impairments: abnormal coordination, abnormal gait, activity intolerance, impaired balance, impaired physical strength, lacks appropriate home exercise program, pain with function, safety issue, poor posture  and poor body mechanics  Functional limitations: walking, stairs  Symptom irritability: low    Assessment details: Pt is a 30 year old female referred to skilled PT services with primary complaint of Lt knee pain. Pertinent PMH includes Iron deficiency, migraines, seasonal allergies. Pt  presents at initial evaluation with above listed impairments. Notable findings include R hip weakness, poor patellar tracking, poor functional body mechanics. Sx are of low irritability at this point, and pt would likely benefit from advanced functional strength training program to prevent exacerbation of sx in the future. Based on objective and subjective measures, pt is a good candidate for skilled PT services. Pt will benefit from skilled PT services in order to return to PLOF.   Understanding of Dx/Px/POC: excellent     Prognosis: excellent    Goals  STG to be acheived within 6 weeks:   1. Pt will be independent and compliant with initial HEP for improvement of LE neuromuscular control and strength.   2. Pt will improve global hip MMTs by at least 1/2  level from eval in order to promote functional strength for walking and stair climbing .    LTG to be acheived within 12 weeks:   1. Pt will be independent and compliant with advanced HEP for progression and maintenance of gains made in therapy.   2. Pt will improve global hip MMTs by at least 1 full level from eval in order to promote functional strength for walking and stair climbing .    3. Pt will improve FOTO score to at least predicted value at evaluation to demonstrate subjective improvement in pt's ability to perform ADLs.         Plan  Patient would benefit from: skilled physical therapy  Planned modality interventions: cryotherapy and thermotherapy: hydrocollator packs    Planned therapy interventions: IASTM, joint mobilization, abdominal trunk stabilization, balance, balance/weight bearing training, manual therapy, massage, motor coordination training, neuromuscular re-education, therapeutic activities, therapeutic exercise, functional ROM exercises, gait training, home exercise program, flexibility, coordination, body mechanics training, stretching and strengthening    Frequency: 1-2x week  Duration in weeks: 12  Plan of Care beginning date:  "2025  Plan of Care expiration date: 2025  Treatment plan discussed with: patient        Subjective Evaluation    History of Present Illness  Mechanism of injury: Pt has a \"very old\" soccer/gymnastics/kickboxing injury. She was hit in her Lt knee by a cleat. Since then, she states it has felt really unstable. In the past few months she noted that it was harder to walk. This past month, she notes that she has been doing some stretching and massage and it has been \"significantly better.\"  When pain was bad, she would get \"firework\" pain that felt like a shooting tingling. It has been about a year since this pain has occurred.     No hx of any knee surgeries but she did wear a hinged knee brace for 1 year.     Sleep: used to disrupt her sleep but does not anymore            Recurrent probem    Quality of life: good    Patient Goals  Patient goals for therapy: decreased pain    Pain  Current pain ratin  At best pain ratin  At worst pain ratin  Location: lateral knee joint and sometimes inferior patellofemoral  Quality: sharp and dull ache  Alleviating factors: painkillers, tens, massage.  Exacerbated by: walking, weather, angling the knee valgus, stairs.  Progression: improved    Social Support  Lives in: apartment    Employment status: working (pharmaceutical work)  Exercise history: 1 hour walks 2x/week      Diagnostic Tests  MRI studies: abnormal (no significant deformities, see imaging tab in chart for details)  Treatments  Previous treatment: medication and massage  Current treatment: physical therapy        Objective    Squat: valgus collapse of Lt side, excess anterior pelvic tilt     Patellar tracking: decreased medial tracking on Lt compared to R      AROM   - knee flexion R 121 Lt 120   - knee extension R 3 deg hyperextension Lt 5 deg hyperextension    MMT/STRENGTH   - hip flexion R 4+/5 5/5  - knee extension R 5/5 Lt 4/5   - knee flexion R 4/5 Lt 4/5   - hip abduction R 4/5 Lt " 4-/5    Balance  - Single leg R 30/30 sec Lt 30/30          Precautions: none  CO-MORBIDITIES: Iron deficiency, migraines, seasonal allergies  FOTO Completed On: 6/19/25    POC Expires Reeval for Medicare to be completed Unit Limit Auth Expiration Date PT/OT/STVisit Limit   9/11/2025 By visit NA 4 per day N/A BOMN    Completed on visit                    TREATMENT DIARY  Auth Status DATE 6/19        NA Visit # 1         Remaining         MANUAL THERAPY         Missy rodríguez                                                       THERAPEUTIC EXERCISE HEP         Heel raises 2x15 4x/week 2x12         Side lying hip abduction 2x10 4x/week 2x5 ea side         Bridges  2x10 4x/week 2x10         Leg press          Single leg RDL                                                                                                              NEUROMUSCULAR REEDUCATION           Single leg balance  3x30 sec ea side  2x30 ea side         Single leg balance foam           Dead bugs          planks                                                                                                              THERAPEUTIC ACTIVITY          Step ups           lunges                              GAIT TRAINING                                                  MODALITIES